# Patient Record
Sex: FEMALE | Race: BLACK OR AFRICAN AMERICAN | NOT HISPANIC OR LATINO | Employment: OTHER | URBAN - METROPOLITAN AREA
[De-identification: names, ages, dates, MRNs, and addresses within clinical notes are randomized per-mention and may not be internally consistent; named-entity substitution may affect disease eponyms.]

---

## 2017-10-09 ENCOUNTER — HOSPITAL ENCOUNTER (OUTPATIENT)
Dept: RADIOLOGY | Facility: HOSPITAL | Age: 62
Discharge: HOME | End: 2017-10-09
Attending: NURSE PRACTITIONER
Payer: MEDICARE

## 2017-10-09 ENCOUNTER — OFFICE VISIT (OUTPATIENT)
Dept: PULMONOLOGY | Facility: CLINIC | Age: 62
End: 2017-10-09
Payer: MEDICARE

## 2017-10-09 ENCOUNTER — PROCEDURE VISIT (OUTPATIENT)
Dept: PULMONOLOGY | Facility: CLINIC | Age: 62
End: 2017-10-09
Payer: MEDICARE

## 2017-10-09 VITALS
HEART RATE: 69 BPM | DIASTOLIC BLOOD PRESSURE: 76 MMHG | OXYGEN SATURATION: 98 % | SYSTOLIC BLOOD PRESSURE: 122 MMHG | RESPIRATION RATE: 18 BRPM | HEIGHT: 67 IN

## 2017-10-09 DIAGNOSIS — G47.33 OSA (OBSTRUCTIVE SLEEP APNEA): ICD-10-CM

## 2017-10-09 DIAGNOSIS — D86.9 SARCOIDOSIS: Primary | ICD-10-CM

## 2017-10-09 DIAGNOSIS — D86.9 SARCOIDOSIS: ICD-10-CM

## 2017-10-09 PROCEDURE — 71020 XR CHEST PA AND LATERAL: CPT | Mod: TC,PO

## 2017-10-09 PROCEDURE — 94729 DIFFUSING CAPACITY: CPT | Mod: PBBFAC,PO | Performed by: GENERAL PRACTICE

## 2017-10-09 PROCEDURE — 94726 PLETHYSMOGRAPHY LUNG VOLUMES: CPT | Mod: PBBFAC,PO | Performed by: GENERAL PRACTICE

## 2017-10-09 PROCEDURE — 99999 PR PBB SHADOW E&M-EST. PATIENT-LVL III: CPT | Mod: PBBFAC,,, | Performed by: NURSE PRACTITIONER

## 2017-10-09 PROCEDURE — 94729 DIFFUSING CAPACITY: CPT | Mod: 26,S$PBB,, | Performed by: INTERNAL MEDICINE

## 2017-10-09 PROCEDURE — 94060 EVALUATION OF WHEEZING: CPT | Mod: PBBFAC,PO | Performed by: GENERAL PRACTICE

## 2017-10-09 PROCEDURE — 94060 EVALUATION OF WHEEZING: CPT | Mod: 26,S$PBB,, | Performed by: INTERNAL MEDICINE

## 2017-10-09 PROCEDURE — 94726 PLETHYSMOGRAPHY LUNG VOLUMES: CPT | Mod: 26,S$PBB,, | Performed by: INTERNAL MEDICINE

## 2017-10-09 PROCEDURE — 71020 XR CHEST PA AND LATERAL: CPT | Mod: 26,,, | Performed by: RADIOLOGY

## 2017-10-09 PROCEDURE — 99214 OFFICE O/P EST MOD 30 MIN: CPT | Mod: S$PBB,,, | Performed by: NURSE PRACTITIONER

## 2017-10-09 PROCEDURE — 99213 OFFICE O/P EST LOW 20 MIN: CPT | Mod: PBBFAC,25,PO | Performed by: NURSE PRACTITIONER

## 2017-10-09 NOTE — PROGRESS NOTES
"Subjective:      Patient ID: Rose Leal is a 62 y.o. female.    Chief Complaint: Sarcoidosis    Sarcoidosis diagnosed by bronchoscopy 1992 by Dr. Villegas. No fever, chills, or hemoptysis. No pleuritic type chest pain. Breathing is stable as compared to 6 months ago. PATEL at times. BMI 36  Severe LYLE dx 2009, not on CPAP. She is having frequent awakenings and not refreshed when she wakes up. We discussed reevaluating her LYLE and anticipate CPAP therapy.   Recent compliants of Chest pain with negative eval per Dr. Greenberg.    No fever, chills, or hemoptysis. No pleuritic type chest pain. Breathing is stable as compared to 6 months ago.      Patient Active Problem List:     Multiple joint pain     H/O sarcoidosis     Sleep apnea     CRP elevated     Parotid gland enlargement     Sicca syndrome     Sarcoidosis     Vitamin D deficiency     Fibromyalgia            /76   Pulse 69   Resp 18   Ht 5' 7" (1.702 m)   SpO2 98%   There is no height or weight on file to calculate BMI.    Review of Systems   Constitutional: Negative.    HENT: Negative.    Respiratory: Negative.    Cardiovascular: Positive for chest pain.   Musculoskeletal: Negative.    Gastrointestinal: Negative.    Neurological: Negative.    Psychiatric/Behavioral: Positive for sleep disturbance.     Objective:      Physical Exam   Constitutional: She is oriented to person, place, and time. She appears well-developed and well-nourished.   Obese   HENT:   Head: Normocephalic and atraumatic.   Mouth/Throat: Oropharynx is clear and moist.   Mallampati Score: III     Neck: Normal range of motion. Neck supple.   Cardiovascular: Normal rate and regular rhythm.  Exam reveals no gallop.    No murmur heard.  Pulmonary/Chest: Effort normal and breath sounds normal.   Abdominal: Soft. She exhibits no mass. There is no tenderness.   Musculoskeletal: Normal range of motion. She exhibits no edema.   Neurological: She is alert and oriented to person, place, and " time.   Skin: Skin is warm and dry.   Psychiatric: She has a normal mood and affect.     Personal Diagnostic Review  PFT: FVC 85 % of predicted. FEV1 82 % of predicted. TLC 72 % of predicted. DLCO 59% of predicted. Minimal decrease so we will review again in 6 months    Results for orders placed during the hospital encounter of 10/09/17   X-Ray Chest PA And Lateral    Narrative Chest two views.  Brigida in 10/17/2016.    Findings: There is stable cardiomegaly and aortic uncoiling.  Lungs remain clear and free of active infiltrate or effusion.  Multilevel thoracic spondylosis.  Postsurgical changes cervical spine.    Impression  Stable chest.      Electronically signed by: KRISTA MEDEROS MD  Date:     10/09/17  Time:    09:56          Assessment:       1. Sarcoidosis    2. LYLE (obstructive sleep apnea)        Outpatient Encounter Prescriptions as of 10/9/2017   Medication Sig Dispense Refill    albuterol 90 mcg/actuation inhaler Inhale 2 puffs into the lungs every 4 (four) hours as needed for Wheezing. 1 Inhaler 2    amitriptyline (ELAVIL) 25 MG tablet Take 1 tablet (25 mg total) by mouth every evening. 90 tablet 3    amlodipine-benazepril (LOTREL) 10-40 mg per capsule Take 1 capsule by mouth once daily.  0    carvedilol (COREG) 6.25 MG tablet Take 6.25 mg by mouth 2 (two) times daily with meals.  0    cyclobenzaprine (FLEXERIL) 10 MG tablet   0    ergocalciferol (ERGOCALCIFEROL) 50,000 unit Cap Take 1 capsule (50,000 Units total) by mouth every 7 days. 12 capsule 1    gabapentin (NEURONTIN) 300 MG capsule Take 1 capsule (300 mg total) by mouth 3 (three) times daily. 90 capsule 3    glimepiride (AMARYL) 1 MG tablet 2 mg once daily.  0    hydrocodone-acetaminophen 7.5-325mg (NORCO) 7.5-325 mg per tablet Take 1 tablet by mouth every 6 (six) hours as needed.      ibuprofen (ADVIL,MOTRIN) 800 MG tablet Take 800 mg by mouth 3 (three) times daily.      methotrexate 2.5 MG Tab Take 4 tablets (10 mg total) by mouth  every 7 days. 20 tablet 1    NEXIUM 40 mg capsule 40 mg once daily.  0    pravastatin (PRAVACHOL) 40 MG tablet Take 40 mg by mouth once daily.  0     No facility-administered encounter medications on file as of 10/9/2017.      No orders of the defined types were placed in this encounter.    Plan:   Formal polysomnogram for evaluation of sleep apnea. Return to clinic when study is available for review.

## 2017-10-10 LAB
POST FEF 25 75: 1.94 L/S (ref 1.46–2.96)
POST FET 100: 8.02 S
POST FEV1 FVC: 79 %
POST FEV1: 1.94 L (ref 1.98–2.64)
POST FIF 50: 2.21 L/S
POST FVC: 2.45 L (ref 2.55–3.33)
POST PEF: 4.3 L/S (ref 4.86–7.05)
PRE DLCO: 15.97 ML/MMHG/MIN (ref 22.81–31.1)
PRE ERV: 0.56 L
PRE FEF 25 75: 1.68 L/S (ref 1.46–2.96)
PRE FET 100: 7.6 S
PRE FEV1 FVC: 76 %
PRE FEV1: 1.89 L (ref 1.98–2.64)
PRE FIF 50: 1.98 L/S
PRE FRC PL: 2.03 L (ref 2–2.95)
PRE FVC: 2.49 L (ref 2.55–3.33)
PRE KROGHS K: 3.76 1/MIN
PRE PEF: 3.88 L/S (ref 4.86–7.05)
PRE RV: 1.43 L (ref 1.75–2.46)
PRE SVC: 2.53 L
PRE TLC: 3.96 L (ref 5.1–5.86)
PREDICTED DLCO: 26.96 ML/MMHG/MIN (ref 22.81–31.1)
PREDICTED FEV1 FVC: 77.63 % (ref 72.74–82.53)
PREDICTED FEV1: 2.31 L (ref 1.98–2.64)
PREDICTED FRC N2: 2.47 L (ref 2–2.95)
PREDICTED FRC PL: 2.47 L (ref 2–2.95)
PREDICTED FVC: 2.94 L (ref 2.55–3.33)
PREDICTED RV: 2.11 L (ref 1.75–2.46)
PREDICTED SVC: 3.31 L
PREDICTED TLC: 5.48 L (ref 5.1–5.86)
PROVOCATION PROTOCOL: ABNORMAL

## 2017-10-26 ENCOUNTER — HOSPITAL ENCOUNTER (OUTPATIENT)
Dept: SLEEP MEDICINE | Facility: HOSPITAL | Age: 62
Discharge: HOME | End: 2017-10-26
Attending: PATHOLOGY
Payer: MEDICARE

## 2017-10-26 DIAGNOSIS — G47.33 OSA (OBSTRUCTIVE SLEEP APNEA): Primary | ICD-10-CM

## 2017-10-26 DIAGNOSIS — Z72.821 INADEQUATE SLEEP HYGIENE: ICD-10-CM

## 2017-10-26 PROCEDURE — 95811 POLYSOM 6/>YRS CPAP 4/> PARM: CPT

## 2017-10-26 PROCEDURE — 95811 POLYSOM 6/>YRS CPAP 4/> PARM: CPT | Mod: 26,,, | Performed by: PSYCHOLOGIST

## 2017-10-26 PROCEDURE — 95810 POLYSOM 6/> YRS 4/> PARAM: CPT

## 2017-11-14 ENCOUNTER — OFFICE VISIT (OUTPATIENT)
Dept: RHEUMATOLOGY | Facility: CLINIC | Age: 62
End: 2017-11-14
Payer: MEDICARE

## 2017-11-14 ENCOUNTER — LAB VISIT (OUTPATIENT)
Dept: LAB | Facility: HOSPITAL | Age: 62
End: 2017-11-14
Attending: INTERNAL MEDICINE
Payer: MEDICARE

## 2017-11-14 VITALS
DIASTOLIC BLOOD PRESSURE: 78 MMHG | HEART RATE: 79 BPM | BODY MASS INDEX: 36.91 KG/M2 | SYSTOLIC BLOOD PRESSURE: 139 MMHG | WEIGHT: 235.69 LBS

## 2017-11-14 DIAGNOSIS — M79.7 FIBROMYALGIA: Primary | ICD-10-CM

## 2017-11-14 DIAGNOSIS — D86.9 SARCOIDOSIS: ICD-10-CM

## 2017-11-14 DIAGNOSIS — G47.33 OBSTRUCTIVE SLEEP APNEA SYNDROME: ICD-10-CM

## 2017-11-14 DIAGNOSIS — E55.9 VITAMIN D DEFICIENCY: ICD-10-CM

## 2017-11-14 LAB
25(OH)D3+25(OH)D2 SERPL-MCNC: 11 NG/ML
ALBUMIN SERPL BCP-MCNC: 3.6 G/DL
ALP SERPL-CCNC: 114 U/L
ALT SERPL W/O P-5'-P-CCNC: 11 U/L
ANION GAP SERPL CALC-SCNC: 10 MMOL/L
AST SERPL-CCNC: 11 U/L
BASOPHILS # BLD AUTO: 0.02 K/UL
BASOPHILS NFR BLD: 0.2 %
BILIRUB SERPL-MCNC: 0.3 MG/DL
BUN SERPL-MCNC: 18 MG/DL
CALCIUM SERPL-MCNC: 9.3 MG/DL
CHLORIDE SERPL-SCNC: 104 MMOL/L
CO2 SERPL-SCNC: 28 MMOL/L
CREAT SERPL-MCNC: 1 MG/DL
CRP SERPL-MCNC: 20.6 MG/L
DIFFERENTIAL METHOD: ABNORMAL
EOSINOPHIL # BLD AUTO: 0.1 K/UL
EOSINOPHIL NFR BLD: 1.6 %
ERYTHROCYTE [DISTWIDTH] IN BLOOD BY AUTOMATED COUNT: 13.8 %
ERYTHROCYTE [SEDIMENTATION RATE] IN BLOOD BY WESTERGREN METHOD: 54 MM/HR
EST. GFR  (AFRICAN AMERICAN): >60 ML/MIN/1.73 M^2
EST. GFR  (NON AFRICAN AMERICAN): >60 ML/MIN/1.73 M^2
GLUCOSE SERPL-MCNC: 163 MG/DL
HCT VFR BLD AUTO: 35.9 %
HGB BLD-MCNC: 11.7 G/DL
LYMPHOCYTES # BLD AUTO: 3 K/UL
LYMPHOCYTES NFR BLD: 34.2 %
MCH RBC QN AUTO: 27.9 PG
MCHC RBC AUTO-ENTMCNC: 32.6 G/DL
MCV RBC AUTO: 86 FL
MONOCYTES # BLD AUTO: 0.4 K/UL
MONOCYTES NFR BLD: 4.9 %
NEUTROPHILS # BLD AUTO: 5.1 K/UL
NEUTROPHILS NFR BLD: 59.1 %
PLATELET # BLD AUTO: 291 K/UL
PMV BLD AUTO: 9.7 FL
POTASSIUM SERPL-SCNC: 3.5 MMOL/L
PROT SERPL-MCNC: 7.8 G/DL
RBC # BLD AUTO: 4.19 M/UL
SODIUM SERPL-SCNC: 142 MMOL/L
WBC # BLD AUTO: 8.69 K/UL

## 2017-11-14 PROCEDURE — 86140 C-REACTIVE PROTEIN: CPT

## 2017-11-14 PROCEDURE — 85025 COMPLETE CBC W/AUTO DIFF WBC: CPT | Mod: PO

## 2017-11-14 PROCEDURE — 99213 OFFICE O/P EST LOW 20 MIN: CPT | Mod: PBBFAC,PO | Performed by: INTERNAL MEDICINE

## 2017-11-14 PROCEDURE — 80053 COMPREHEN METABOLIC PANEL: CPT | Mod: PO

## 2017-11-14 PROCEDURE — 82652 VIT D 1 25-DIHYDROXY: CPT

## 2017-11-14 PROCEDURE — 85651 RBC SED RATE NONAUTOMATED: CPT | Mod: PO

## 2017-11-14 PROCEDURE — 82306 VITAMIN D 25 HYDROXY: CPT

## 2017-11-14 PROCEDURE — 99215 OFFICE O/P EST HI 40 MIN: CPT | Mod: S$PBB,,, | Performed by: INTERNAL MEDICINE

## 2017-11-14 PROCEDURE — 36415 COLL VENOUS BLD VENIPUNCTURE: CPT | Mod: PO

## 2017-11-14 PROCEDURE — 99999 PR PBB SHADOW E&M-EST. PATIENT-LVL III: CPT | Mod: PBBFAC,,, | Performed by: INTERNAL MEDICINE

## 2017-11-14 RX ORDER — ERGOCALCIFEROL 1.25 MG/1
50000 CAPSULE ORAL
Qty: 12 CAPSULE | Refills: 4 | Status: SHIPPED | OUTPATIENT
Start: 2017-11-14 | End: 2018-09-26

## 2017-11-14 NOTE — PROGRESS NOTES
RHEUMATOLOGY CLINIC FOLLOW UP VISIT  Chief complaints:-  My whole body hurts.     HPI:-  Rose Soliz a 62 y.o. pleasant female comes in for a follow up visit with above chief complaints.   Location- All over including muscle, joints and soft tissues.   Severity-Moderate to severe.   Timing- All day.  Modifying factors-Worse with activity of any kind.   Quality- Sharp.  Duration- Several years.   Context- History of sarcoidosis , Fibromyalgia- lost for follow up since 03/2016.   Associated signs & symptoms-  Fatigue, insomnia, joint pain. No associated fever/chills/cough/hemoptysis/unintentional weight loss.     Review of Systems   Constitutional: Negative for chills and fever.   HENT: Negative for congestion and sore throat.    Eyes: Negative for blurred vision and redness.   Respiratory: Negative for cough and shortness of breath.    Cardiovascular: Negative for chest pain and leg swelling.   Gastrointestinal: Negative for abdominal pain.   Genitourinary: Negative for dysuria.   Musculoskeletal: Positive for back pain, joint pain, myalgias and neck pain. Negative for falls.   Skin: Negative for rash.   Neurological: Negative for headaches.   Endo/Heme/Allergies: Does not bruise/bleed easily.   Psychiatric/Behavioral: Negative for memory loss. The patient does not have insomnia.        Past Medical History:   Diagnosis Date    Arthritis     Blunt trauma, left eye     Carpal tunnel syndrome     Diabetes mellitus     Hypertension     Neuromuscular disorder     LYLE (obstructive sleep apnea) Severe    Sarcoidosis of lung        Past Surgical History:   Procedure Laterality Date    BREAST SURGERY      HYSTERECTOMY      NECK SURGERY          Social History   Substance Use Topics    Smoking status: Never Smoker    Smokeless tobacco: Never Used    Alcohol use Yes      Comment: occasional       Family History   Problem Relation Age of Onset     Cataracts Mother     Cataracts Maternal Grandmother     Glaucoma Maternal Grandmother        Review of patient's allergies indicates:   Allergen Reactions    Percocet [oxycodone-acetaminophen] Nausea And Vomiting    Prednisone Edema           Physical examination:-    Vitals:    11/14/17 0756   BP: 139/78   Pulse: 79   Weight: 106.9 kg (235 lb 10.8 oz)   PainSc:   7   PainLoc: Generalized       Physical Exam   Constitutional: She is oriented to person, place, and time and well-developed, well-nourished, and in no distress. No distress.   HENT:   Head: Normocephalic.   Mouth/Throat: Oropharynx is clear and moist.   Eyes: Conjunctivae and EOM are normal. Pupils are equal, round, and reactive to light.   Neck: Normal range of motion. Neck supple.   Cardiovascular: Normal rate and intact distal pulses.    Pulmonary/Chest: Effort normal. No respiratory distress.   Abdominal: Soft. There is no tenderness.   Musculoskeletal:   No synovitis over small joints of hands or feet.  No effusion over large joints.   Neurological: She is alert and oriented to person, place, and time. No cranial nerve deficit.   Skin: Skin is warm. No rash noted. No erythema.   Psychiatric: Mood and affect normal.   Nursing note and vitals reviewed.      Labs:-  Normal ACE levels.     Radiographs:-  Independent visualization of images done. No hilar lymphadenopathy on chest xray.     Medication List with Changes/Refills   Current Medications    ALBUTEROL 90 MCG/ACTUATION INHALER    Inhale 2 puffs into the lungs every 4 (four) hours as needed for Wheezing.    AMITRIPTYLINE (ELAVIL) 25 MG TABLET    Take 1 tablet (25 mg total) by mouth every evening.    AMLODIPINE-BENAZEPRIL (LOTREL) 10-40 MG PER CAPSULE    Take 1 capsule by mouth once daily.    CARVEDILOL (COREG) 6.25 MG TABLET    Take 6.25 mg by mouth 2 (two) times daily with meals.    CYCLOBENZAPRINE (FLEXERIL) 10 MG TABLET        GABAPENTIN (NEURONTIN) 300 MG CAPSULE    Take 1 capsule (300 mg  total) by mouth 3 (three) times daily.    GLIMEPIRIDE (AMARYL) 1 MG TABLET    2 mg once daily.    HYDROCODONE-ACETAMINOPHEN 7.5-325MG (NORCO) 7.5-325 MG PER TABLET    Take 1 tablet by mouth every 6 (six) hours as needed.    IBUPROFEN (ADVIL,MOTRIN) 800 MG TABLET    Take 800 mg by mouth 3 (three) times daily.    NEXIUM 40 MG CAPSULE    40 mg once daily.    PRAVASTATIN (PRAVACHOL) 40 MG TABLET    Take 40 mg by mouth once daily.   Changed and/or Refilled Medications    Modified Medication Previous Medication    ERGOCALCIFEROL (ERGOCALCIFEROL) 50,000 UNIT CAP ergocalciferol (ERGOCALCIFEROL) 50,000 unit Cap       Take 1 capsule (50,000 Units total) by mouth every 7 days.    Take 1 capsule (50,000 Units total) by mouth every 7 days.   Discontinued Medications    METHOTREXATE 2.5 MG TAB    Take 4 tablets (10 mg total) by mouth every 7 days.       Assessment/Plans:-  # Fibromyalgia  Active. On gabapentin, elavil and opioid therapy from pain specialist at [facility]. Would recommend adding SNRI if active symptoms after consistent use of CPAP therapy for Obstructive sleep apnea syndrome.    # Vitamin D deficiency  Check levels today. Restart vitamin D supplementation.   - Vitamin D; Future  - ergocalciferol (ERGOCALCIFEROL) 50,000 unit Cap; Take 1 capsule (50,000 Units total) by mouth every 7 days.  Dispense: 12 capsule; Refill: 4    # Sarcoidosis  Sarcoidosis diagnosed by bronchoscopy 1992 by Dr. Villegas. No active symptoms. No systemic B symptoms. Last ACE level normal. Not on any DMARD's. Lost for follow up since 03/2016. Recent chest xray normal. Severe sleep apnea syndrome .   - CBC auto differential; Standing  - Comprehensive metabolic panel; Standing  - C-reactive protein; Standing  - Sedimentation rate, manual; Standing  - Angiotensin converting enzyme; Standing  - Calcitriol; Future    # Obstructive sleep apnea syndrome  Recent sleep study showed severe apnea with improvement on CPAP. Recommended to contact  sleep clinic for initiation of CPAP therapy.     # Return in about 1 year (around 11/14/2018).    Time spent: 30 minutes in face to face discussion concerning diagnosis, prognosis, review of lab and test results, benefits of treatment as well as management of disease, counseling of patient and coordination of care between various health care providers . Greater than half the time spent was used for coordination of care and counseling of patient.      Disclaimer: This note was prepared using voice recognition system and is likely to have sound alike errors and is not proof read.  Please call me with any questions.

## 2017-11-14 NOTE — ASSESSMENT & PLAN NOTE
Recent sleep study showed severe apnea with improvement on CPAP. Recommended to contact sleep clinic for initiation of CPAP therapy.

## 2017-11-14 NOTE — ASSESSMENT & PLAN NOTE
Sarcoidosis diagnosed by bronchoscopy 1992 by Dr. Villegas. No active symptoms. No systemic B symptoms. Last ACE level normal. Not on any DMARD's. Lost for follow up since 03/2016. Recent chest xray normal. Severe sleep apnea syndrome .

## 2017-11-14 NOTE — ASSESSMENT & PLAN NOTE
Active. On gabapentin, elavil and opioid therapy from pain specialist at [facility] center. Would recommend adding SNRI if active symptoms after consistent use of CPAP therapy for Obstructive sleep apnea syndrome.

## 2017-11-15 LAB — ACE SERPL-CCNC: 8 U/L

## 2017-11-16 LAB — 1,25(OH)2D3 SERPL-MCNC: 58 PG/ML

## 2018-02-28 ENCOUNTER — OFFICE VISIT (OUTPATIENT)
Dept: SLEEP MEDICINE | Facility: CLINIC | Age: 63
End: 2018-02-28
Payer: MEDICARE

## 2018-02-28 VITALS
OXYGEN SATURATION: 95 % | HEART RATE: 112 BPM | DIASTOLIC BLOOD PRESSURE: 70 MMHG | HEIGHT: 67 IN | SYSTOLIC BLOOD PRESSURE: 120 MMHG | BODY MASS INDEX: 37.02 KG/M2 | RESPIRATION RATE: 17 BRPM | WEIGHT: 235.88 LBS

## 2018-02-28 DIAGNOSIS — G47.33 OSA (OBSTRUCTIVE SLEEP APNEA): ICD-10-CM

## 2018-02-28 DIAGNOSIS — D86.9 SARCOIDOSIS: Primary | ICD-10-CM

## 2018-02-28 DIAGNOSIS — R05.9 COUGH: ICD-10-CM

## 2018-02-28 PROCEDURE — 99214 OFFICE O/P EST MOD 30 MIN: CPT | Mod: S$PBB,,, | Performed by: NURSE PRACTITIONER

## 2018-02-28 PROCEDURE — 99999 PR PBB SHADOW E&M-EST. PATIENT-LVL IV: CPT | Mod: PBBFAC,,, | Performed by: NURSE PRACTITIONER

## 2018-02-28 PROCEDURE — 99214 OFFICE O/P EST MOD 30 MIN: CPT | Mod: PBBFAC,PO | Performed by: NURSE PRACTITIONER

## 2018-02-28 RX ORDER — METHYLPREDNISOLONE 4 MG/1
TABLET ORAL
Qty: 1 PACKAGE | Refills: 0 | Status: SHIPPED | OUTPATIENT
Start: 2018-02-28 | End: 2018-07-10 | Stop reason: ALTCHOICE

## 2018-02-28 NOTE — PROGRESS NOTES
"Subjective:      Patient ID: Rose Leal is a 63 y.o. female.    Chief Complaint: Cough    Patient presents to the office today for cough for 3 weeks.  She has sleep study in October, and missed her appointment in November.  She has daytime hypersomnolence, frequent awakenings.  Poor sleep quality.  Diagnosis with severe sleep apnea in 2009. No on CPAP.    On 2/9/18 patient went to [facility] ED with cough, chest pain.  She was diagnosed with an upper respiratory infection.  She went to see Dr. Manny Escalante.  She was prescribed promethazine DM, doxycycline, Symbicort, Tessalon, Xyzal.  She has not received her Xyzal.  Instructed to take Symbicort 2 puffs, twice a day at this time.  Her cough is typically dry, but clear mucus at times.  Possible wheezing        Sarcoidosis diagnosed by bronchoscopy 1992 by Dr. Villegas. No fever, chills, or hemoptysis. No pleuritic type chest pain.        Patient Active Problem List:     Multiple joint pain     H/O sarcoidosis     Sleep apnea     CRP elevated     Parotid gland enlargement     Sicca syndrome     Sarcoidosis     Vitamin D deficiency     Fibromyalgia            /70   Pulse (!) 112   Resp 17   Ht 5' 7" (1.702 m)   Wt 107 kg (235 lb 14.3 oz)   SpO2 95%   BMI 36.95 kg/m²   Body mass index is 36.95 kg/m².    Review of Systems   Constitutional: Negative.    HENT: Positive for postnasal drip.    Respiratory: Positive for cough and shortness of breath.    Cardiovascular: Negative.    Musculoskeletal: Positive for arthralgias.   Gastrointestinal: Negative.    Neurological: Negative.    Psychiatric/Behavioral: Negative.      Objective:      Physical Exam   Constitutional: She is oriented to person, place, and time. She appears well-developed and well-nourished.   Obese   HENT:   Head: Normocephalic and atraumatic.   Mouth/Throat: Oropharynx is clear and moist.   Neck: Normal range of motion. Neck supple.   Cardiovascular: Normal rate and regular rhythm.  Exam reveals " no gallop.    No murmur heard.  Pulmonary/Chest: Effort normal and breath sounds normal.   Abdominal: Soft. She exhibits no mass. There is no tenderness.   Musculoskeletal: Normal range of motion. She exhibits no edema.   Neurological: She is alert and oriented to person, place, and time.   Skin: Skin is warm and dry.   Psychiatric: She has a normal mood and affect.     Personal Diagnostic Review  Review of CXR from [facility] ED without infiltrate.        The diagnostic polysomnography revealed a severe obstructive sleep apnea / hypopnea syndrome (A + H Index = 71.6 events  /hr asleep with 64.1 respiratory event - arousals /hr asleep for the study, and an additional 12.3 RERAs /hr asleep (respiratory  effort - related arousals). The mean SpO2 value was 94.8 %, mild, minimum oxygen saturation during sleep was 84.0 %, and  waking baseline SpO2 was 98 %. Persistent, moderately loud snoring was noted.  2. CPAP was initiated at 1:44 am. The titration polysomnography revealed that BiPAP (Bi - Flex 3, humidity 2) of 20 cm IPAP  / 16 cm EPAP, the most effective pressure most completely tested, was optimal, as it reduced the rate of respiratory events  to zero in 0.6 hrs sleep (0.3 hrs REM sleep). However, CPAP of 14 cm also was fully tested and also was very effective  (A + H I = 0.7, with 0.4 hrs REM sleep in 1.4 hrs sleep). Soft, sporadic snoring (less severe than pre - CPAP) persisted at  all pressures tested. AutoCPAP (4 cm - 20 cm) could be tried if necessary.        Assessment:       1. Sarcoidosis    2. Cough    3. LYLE (obstructive sleep apnea)        Outpatient Encounter Prescriptions as of 2/28/2018   Medication Sig Dispense Refill    albuterol 90 mcg/actuation inhaler Inhale 2 puffs into the lungs every 4 (four) hours as needed for Wheezing. 1 Inhaler 2    amitriptyline (ELAVIL) 25 MG tablet Take 1 tablet (25 mg total) by mouth every evening. 90 tablet 3    amlodipine-benazepril (LOTREL) 10-40 mg per capsule Take 1  capsule by mouth once daily.  0    carvedilol (COREG) 6.25 MG tablet Take 6.25 mg by mouth 2 (two) times daily with meals.  0    cyclobenzaprine (FLEXERIL) 10 MG tablet   0    ergocalciferol (ERGOCALCIFEROL) 50,000 unit Cap Take 1 capsule (50,000 Units total) by mouth every 7 days. 12 capsule 4    gabapentin (NEURONTIN) 300 MG capsule Take 1 capsule (300 mg total) by mouth 3 (three) times daily. 90 capsule 3    glimepiride (AMARYL) 1 MG tablet 2 mg once daily.  0    hydrocodone-acetaminophen 7.5-325mg (NORCO) 7.5-325 mg per tablet Take 1 tablet by mouth every 6 (six) hours as needed.      ibuprofen (ADVIL,MOTRIN) 800 MG tablet Take 800 mg by mouth 3 (three) times daily.      NEXIUM 40 mg capsule 40 mg once daily.  0    pravastatin (PRAVACHOL) 40 MG tablet Take 40 mg by mouth once daily.  0    methylPREDNISolone (MEDROL DOSEPACK) 4 mg tablet use as directed 1 Package 0     No facility-administered encounter medications on file as of 2/28/2018.      Orders Placed This Encounter   Procedures    CPAP FOR HOME USE     Order Specific Question:   Type:     Answer:   CPAP     Order Specific Question:   CPAP setting (cmH20):     Answer:   14     Order Specific Question:   Length of need (1-99 months):     Answer:   99     Order Specific Question:   Humidification:     Answer:   Heated     Order Specific Question:   Type of mask:     Answer:   FFM     Order Specific Question:   Headgear?     Answer:   Yes     Order Specific Question:   Tubing?     Answer:   Yes     Order Specific Question:   Humidifier chamber?     Answer:   Yes     Order Specific Question:   Chin strap?     Answer:   Yes     Order Specific Question:   Filters?     Answer:   Yes     Plan:   Continue current regimen and add medrol dose yue.   CPAP 14 cm H2O  follow up in 8 weeks with download of data card and review of symptoms.

## 2018-04-25 ENCOUNTER — OFFICE VISIT (OUTPATIENT)
Dept: SLEEP MEDICINE | Facility: CLINIC | Age: 63
End: 2018-04-25
Payer: MEDICARE

## 2018-04-25 VITALS
SYSTOLIC BLOOD PRESSURE: 124 MMHG | BODY MASS INDEX: 37.68 KG/M2 | DIASTOLIC BLOOD PRESSURE: 70 MMHG | OXYGEN SATURATION: 98 % | HEIGHT: 67 IN | RESPIRATION RATE: 18 BRPM | WEIGHT: 240.06 LBS | HEART RATE: 73 BPM

## 2018-04-25 DIAGNOSIS — G47.33 OSA (OBSTRUCTIVE SLEEP APNEA): ICD-10-CM

## 2018-04-25 DIAGNOSIS — D86.9 SARCOIDOSIS: Primary | ICD-10-CM

## 2018-04-25 PROCEDURE — 99214 OFFICE O/P EST MOD 30 MIN: CPT | Mod: S$PBB,,, | Performed by: NURSE PRACTITIONER

## 2018-04-25 PROCEDURE — 99999 PR PBB SHADOW E&M-EST. PATIENT-LVL IV: CPT | Mod: PBBFAC,,, | Performed by: NURSE PRACTITIONER

## 2018-04-25 PROCEDURE — 99214 OFFICE O/P EST MOD 30 MIN: CPT | Mod: PBBFAC,PO | Performed by: NURSE PRACTITIONER

## 2018-04-25 NOTE — PROGRESS NOTES
"Subjective:      Patient ID: Rose Leal is a 63 y.o. female.    Chief Complaint: Sarcoidosis and Sleep Apnea    Presents with LYLE and Sarcoidosis to office for review of CPAP therapy. Pressure setting 14cm H2O with [employer].  Patient states improved symptoms with use of CPAP. Sleeping more soundly. Waking up feeling more refreshed. Improved daytime sleepiness. Patient states she is benefiting from use of the CPAP.   Patient Active Problem List:     Multiple joint pain     H/O sarcoidosis     Obstructive sleep apnea syndrome     CRP elevated     Parotid gland enlargement     Sicca syndrome     Sarcoidosis     Vitamin D deficiency     Fibromyalgia     Inadequate sleep hygiene            /70   Pulse 73   Resp 18   Ht 5' 7" (1.702 m)   Wt 108.9 kg (240 lb 1.3 oz)   SpO2 98%   BMI 37.60 kg/m²   Body mass index is 37.6 kg/m².    Review of Systems   Constitutional: Negative.    HENT: Negative.    Respiratory: Negative for shortness of breath.    Cardiovascular: Negative.    Musculoskeletal: Negative.    Gastrointestinal: Negative.    Neurological: Negative.    Psychiatric/Behavioral: Negative.      Objective:      Physical Exam   Constitutional: She is oriented to person, place, and time. She appears well-developed and well-nourished.   Obese   HENT:   Head: Normocephalic and atraumatic.   Mouth/Throat: Oropharynx is clear and moist.   Neck: Normal range of motion. Neck supple.   Cardiovascular: Normal rate and regular rhythm.  Exam reveals no gallop.    No murmur heard.  Pulmonary/Chest: Effort normal and breath sounds normal.   Abdominal: Soft. She exhibits no mass. There is no tenderness.   Musculoskeletal: Normal range of motion. She exhibits no edema.   Neurological: She is alert and oriented to person, place, and time.   Skin: Skin is warm and dry.   Psychiatric: She has a normal mood and affect.     Personal Diagnostic Review    CPAP download over the last 30 days show patient wears on average 6 hrs " and 53 minutes. Greater than 4 hrs 96 % of the time. AHI 4    Assessment:       1. Sarcoidosis    2. LYLE (obstructive sleep apnea)        Outpatient Encounter Prescriptions as of 4/25/2018   Medication Sig Dispense Refill    albuterol 90 mcg/actuation inhaler Inhale 2 puffs into the lungs every 4 (four) hours as needed for Wheezing. 1 Inhaler 2    amitriptyline (ELAVIL) 25 MG tablet Take 1 tablet (25 mg total) by mouth every evening. 90 tablet 3    amlodipine-benazepril (LOTREL) 10-40 mg per capsule Take 1 capsule by mouth once daily.  0    carvedilol (COREG) 6.25 MG tablet Take 6.25 mg by mouth 2 (two) times daily with meals.  0    cyclobenzaprine (FLEXERIL) 10 MG tablet   0    ergocalciferol (ERGOCALCIFEROL) 50,000 unit Cap Take 1 capsule (50,000 Units total) by mouth every 7 days. 12 capsule 4    gabapentin (NEURONTIN) 300 MG capsule Take 1 capsule (300 mg total) by mouth 3 (three) times daily. 90 capsule 3    glimepiride (AMARYL) 1 MG tablet 2 mg once daily.  0    hydrocodone-acetaminophen 7.5-325mg (NORCO) 7.5-325 mg per tablet Take 1 tablet by mouth every 6 (six) hours as needed.      ibuprofen (ADVIL,MOTRIN) 800 MG tablet Take 800 mg by mouth 3 (three) times daily.      NEXIUM 40 mg capsule 40 mg once daily.  0    pravastatin (PRAVACHOL) 40 MG tablet Take 40 mg by mouth once daily.  0    methylPREDNISolone (MEDROL DOSEPACK) 4 mg tablet use as directed 1 Package 0     No facility-administered encounter medications on file as of 4/25/2018.      Orders Placed This Encounter   Procedures    X-Ray Chest PA And Lateral     Standing Status:   Future     Standing Expiration Date:   4/25/2019     Order Specific Question:   May the Radiologist modify the order per protocol to meet the clinical needs of the patient?     Answer:   Yes    Complete PFT with bronchodilator     Standing Status:   Future     Standing Expiration Date:   4/25/2019     Plan:      Doing well on PAP settings. Patient is compliant.  Follow up in 6 months with PAP data download, CXR, PFT or call earlier if any problems.

## 2018-06-07 ENCOUNTER — TELEPHONE (OUTPATIENT)
Dept: HEMATOLOGY/ONCOLOGY | Facility: CLINIC | Age: 63
End: 2018-06-07

## 2018-06-08 ENCOUNTER — TELEPHONE (OUTPATIENT)
Dept: HEMATOLOGY/ONCOLOGY | Facility: CLINIC | Age: 63
End: 2018-06-08

## 2018-06-08 NOTE — TELEPHONE ENCOUNTER
----- Message from Audra Butt sent at 2018 11:03 AM CDT -----  Contact: pt   .Pt was returning nurse call. Pt states that if she don't answer she will return the call cause she is going to a .    [phone] (home)

## 2018-07-03 ENCOUNTER — INITIAL CONSULT (OUTPATIENT)
Dept: HEMATOLOGY/ONCOLOGY | Facility: CLINIC | Age: 63
End: 2018-07-03
Payer: MEDICARE

## 2018-07-03 ENCOUNTER — LAB VISIT (OUTPATIENT)
Dept: LAB | Facility: HOSPITAL | Age: 63
End: 2018-07-03
Attending: INTERNAL MEDICINE
Payer: MEDICARE

## 2018-07-03 VITALS
SYSTOLIC BLOOD PRESSURE: 149 MMHG | HEIGHT: 67 IN | HEART RATE: 83 BPM | DIASTOLIC BLOOD PRESSURE: 82 MMHG | TEMPERATURE: 98 F | RESPIRATION RATE: 20 BRPM | WEIGHT: 241.19 LBS | OXYGEN SATURATION: 98 % | BODY MASS INDEX: 37.85 KG/M2

## 2018-07-03 DIAGNOSIS — D51.8 OTHER VITAMIN B12 DEFICIENCY ANEMIA: ICD-10-CM

## 2018-07-03 DIAGNOSIS — D64.9 NORMOCYTIC ANEMIA: ICD-10-CM

## 2018-07-03 DIAGNOSIS — D64.9 NORMOCYTIC ANEMIA: Primary | ICD-10-CM

## 2018-07-03 LAB
ALBUMIN SERPL BCP-MCNC: 3.5 G/DL
ALP SERPL-CCNC: 109 U/L
ALT SERPL W/O P-5'-P-CCNC: 12 U/L
ANION GAP SERPL CALC-SCNC: 7 MMOL/L
AST SERPL-CCNC: 12 U/L
BASOPHILS # BLD AUTO: 0.01 K/UL
BASOPHILS NFR BLD: 0.1 %
BILIRUB SERPL-MCNC: 0.3 MG/DL
BUN SERPL-MCNC: 15 MG/DL
CALCIUM SERPL-MCNC: 9.2 MG/DL
CHLORIDE SERPL-SCNC: 106 MMOL/L
CO2 SERPL-SCNC: 28 MMOL/L
CREAT SERPL-MCNC: 1 MG/DL
DIFFERENTIAL METHOD: ABNORMAL
EOSINOPHIL # BLD AUTO: 0.1 K/UL
EOSINOPHIL NFR BLD: 1.7 %
ERYTHROCYTE [DISTWIDTH] IN BLOOD BY AUTOMATED COUNT: 14.8 %
EST. GFR  (AFRICAN AMERICAN): >60 ML/MIN/1.73 M^2
EST. GFR  (NON AFRICAN AMERICAN): >60 ML/MIN/1.73 M^2
FERRITIN SERPL-MCNC: 105 NG/ML
FOLATE SERPL-MCNC: 5.7 NG/ML
GLUCOSE SERPL-MCNC: 169 MG/DL
HCT VFR BLD AUTO: 35.1 %
HGB BLD-MCNC: 11.1 G/DL
IRON SERPL-MCNC: 61 UG/DL
LYMPHOCYTES # BLD AUTO: 2.6 K/UL
LYMPHOCYTES NFR BLD: 33.8 %
MCH RBC QN AUTO: 26.9 PG
MCHC RBC AUTO-ENTMCNC: 31.6 G/DL
MCV RBC AUTO: 85 FL
MONOCYTES # BLD AUTO: 0.4 K/UL
MONOCYTES NFR BLD: 5.3 %
NEUTROPHILS # BLD AUTO: 4.6 K/UL
NEUTROPHILS NFR BLD: 59.1 %
PLATELET # BLD AUTO: 273 K/UL
PMV BLD AUTO: 9.4 FL
POTASSIUM SERPL-SCNC: 3.7 MMOL/L
PROT SERPL-MCNC: 7.2 G/DL
RBC # BLD AUTO: 4.12 M/UL
SATURATED IRON: 16 %
SODIUM SERPL-SCNC: 141 MMOL/L
TOTAL IRON BINDING CAPACITY: 383 UG/DL
TRANSFERRIN SERPL-MCNC: 259 MG/DL
VIT B12 SERPL-MCNC: 262 PG/ML
WBC # BLD AUTO: 7.77 K/UL

## 2018-07-03 PROCEDURE — 83540 ASSAY OF IRON: CPT

## 2018-07-03 PROCEDURE — 84165 PROTEIN E-PHORESIS SERUM: CPT | Mod: 26,,, | Performed by: PATHOLOGY

## 2018-07-03 PROCEDURE — 80053 COMPREHEN METABOLIC PANEL: CPT | Mod: PO

## 2018-07-03 PROCEDURE — 99999 PR PBB SHADOW E&M-EST. PATIENT-LVL III: CPT | Mod: PBBFAC,,, | Performed by: INTERNAL MEDICINE

## 2018-07-03 PROCEDURE — 84165 PROTEIN E-PHORESIS SERUM: CPT

## 2018-07-03 PROCEDURE — 82607 VITAMIN B-12: CPT

## 2018-07-03 PROCEDURE — 99213 OFFICE O/P EST LOW 20 MIN: CPT | Mod: PBBFAC,PO | Performed by: INTERNAL MEDICINE

## 2018-07-03 PROCEDURE — 82746 ASSAY OF FOLIC ACID SERUM: CPT

## 2018-07-03 PROCEDURE — 82728 ASSAY OF FERRITIN: CPT

## 2018-07-03 PROCEDURE — 36415 COLL VENOUS BLD VENIPUNCTURE: CPT | Mod: PO

## 2018-07-03 PROCEDURE — 99205 OFFICE O/P NEW HI 60 MIN: CPT | Mod: S$PBB,,, | Performed by: INTERNAL MEDICINE

## 2018-07-03 PROCEDURE — 85025 COMPLETE CBC W/AUTO DIFF WBC: CPT | Mod: PO

## 2018-07-03 NOTE — PROGRESS NOTES
OUTPATIENT    MAIN COMPLAINT:  We are asked by Dr. Manny Escalante to evaluate this   63-year-old  lady in regard to her anemia.    HISTORY OF PRESENT ILLNESS:  This is a 63-year-old  lady who had   a CBC done at the office of her PCP on 05/22/2018.  The hemoglobin was 10.2   with an MCV of 86.  White cell count was 9000 with normal differential and the   platelet count was 289,000.  There were some records that accompany the patient   from the [facility] where it states that her hemoglobin was 6.7 during   admission to that facility in May 2018.  There is no discharge summary.  One has   been requested.  The patient says she does not remember details of that   admission since she has memory lapses.  She says she did not have a blood   transfusion.    The patient says she had a colonoscopy in 2017, outside the clinic and was told   that it was okay.  This colonoscopy was done because of abdominal pain.  Other   problems of the patient include fibromyalgia and sarcoidosis diagnosed by   bronchoscopy since 1992.    ALLERGIES:  Percocet and prednisone.    PREVIOUS SURGERIES:  Cyst removed from the left breast and hysterectomy.    SOCIAL HISTORY:  She is , with three children.  She lives in [place].   She never smoked.  Denies significant drinking.  She is retired.  She did many   jobs including a , , certified nursing assistant,   and a .    FAMILY HISTORY:  Maternal grandmother had breast cancer.  Paternal grandmother   had leukemia.  Mother and father had diabetes.  Father had coronary stents.    PAST MEDICAL HISTORY:  1.  Obesity.  2.  High blood pressure.  3.  Fibromyalgia.  4.  Sarcoidosis diagnosed by bronchoscopy in 1992.  5.  Diabetes mellitus.  6.  Normocytic anemia.    REVIEW OF SYSTEMS:  GENERAL:  No weight loss or fatigue.  EYES:  No vision problems or excessive lacrimations.  OROPHARYNX:  No difficulty or pain on  swallowing.  ENDOCRINE:  No heat or cold intolerance.  LUNGS:  Moderate shortness of breath on exertion.  CARDIOVASCULAR:  No chest pains or palpitation.  GASTROINTESTINAL:  No nausea, vomiting, or diarrhea.  No blood in the stools or   melena.  GENITOURINARY:  No hematuria, kidney stones or kidney or bladder problems.  No   gynecological issues.  BREASTS:  Some tender breasts, which she said due to caffeine ingestion.  No   masses.  SKIN:  No blisters or ecchymosis.  NEUROLOGIC:  No headaches or seizures.  PSYCHIATRIC:  No mood swings or depression.    PHYSICAL EXAMINATION:  GENERAL:  She was well groomed.  She interacted normally during the interview.  VITAL SIGNS:  Weight 241 pounds, height 67 inches, blood pressure 149/82, pulse   82, respirations 20, and temperature 98.3.  EYES:  No jaundice or paleness.  OROPHARYNX:  No ulcers.  No exudates.  ENDOCRINE:  No palpable thyroid.  LYMPHATICS:  No cervical or supraclavicular adenopathy.  NECK:  No jugular venous distension or masses.  LUNGS:  Clear and well ventilated without rales, wheezes, or rhonchi.  CARDIOVASCULAR:  Heart was rhythmic without murmurs or gallops.  ABDOMEN:  Soft.  No obvious hepatosplenomegaly, guarding, or rebound.  EXTREMITIES:  No edema.  Good dorsalis pedis and posterior tibial pulses.  SKIN:  No rashes or ecchymosis.  NEUROLOGIC:  Coordination, strength and gait were normal.  PSYCHIATRIC:  Mood was appropriate.  Lucid and oriented x3.    DISCUSSION:  This patient comes today for evaluation of anemia.  The lab results   from the [facility] in early May 2018 showed that her hemoglobin at   that time was 6.7.  The most recent one is 10.2.  The patient was unable to   provide any details of why the hemoglobin was so low.    Discharge summary from that admission at the [facility] have been   requested.  Blood test will be done today for a CBC, CMP, ferritin, TIBC, B12,   folic acid, and serum protein electrophoresis.  See me in  about a week.      LAYLA/TINY  dd: 07/03/2018 09:41:31 (CDT)  td: 07/03/2018 10:44:14 (CDT)  Doc ID   #[id number]  Job ID #[id number]    CC: Manny Escalante M.D.

## 2018-07-05 LAB
ALBUMIN SERPL ELPH-MCNC: 3.6 G/DL
ALPHA1 GLOB SERPL ELPH-MCNC: 0.34 G/DL
ALPHA2 GLOB SERPL ELPH-MCNC: 0.97 G/DL
B-GLOBULIN SERPL ELPH-MCNC: 0.95 G/DL
GAMMA GLOB SERPL ELPH-MCNC: 0.95 G/DL
PATHOLOGIST INTERPRETATION SPE: NORMAL
PROT SERPL-MCNC: 6.8 G/DL

## 2018-07-10 ENCOUNTER — APPOINTMENT (RX ONLY)
Dept: URBAN - METROPOLITAN AREA CLINIC 38 | Facility: CLINIC | Age: 63
Setting detail: DERMATOLOGY
End: 2018-07-10

## 2018-07-10 ENCOUNTER — OFFICE VISIT (OUTPATIENT)
Dept: HEMATOLOGY/ONCOLOGY | Facility: CLINIC | Age: 63
End: 2018-07-10
Payer: MEDICARE

## 2018-07-10 VITALS
HEIGHT: 67 IN | BODY MASS INDEX: 37.58 KG/M2 | OXYGEN SATURATION: 100 % | SYSTOLIC BLOOD PRESSURE: 128 MMHG | DIASTOLIC BLOOD PRESSURE: 64 MMHG | HEART RATE: 71 BPM | TEMPERATURE: 98 F | WEIGHT: 239.44 LBS

## 2018-07-10 DIAGNOSIS — D63.8 ANEMIA, CHRONIC DISEASE: ICD-10-CM

## 2018-07-10 DIAGNOSIS — R68.89 FORGETFULNESS: ICD-10-CM

## 2018-07-10 DIAGNOSIS — L29.89 OTHER PRURITUS: ICD-10-CM

## 2018-07-10 DIAGNOSIS — L29.8 OTHER PRURITUS: ICD-10-CM

## 2018-07-10 DIAGNOSIS — D51.8 OTHER VITAMIN B12 DEFICIENCY ANEMIAS: ICD-10-CM

## 2018-07-10 PROBLEM — L70.0 ACNE VULGARIS: Status: ACTIVE | Noted: 2018-07-10

## 2018-07-10 PROBLEM — K75.9 INFLAMMATORY LIVER DISEASE, UNSPECIFIED: Status: ACTIVE | Noted: 2018-07-10

## 2018-07-10 PROCEDURE — 99202 OFFICE O/P NEW SF 15 MIN: CPT

## 2018-07-10 PROCEDURE — 99213 OFFICE O/P EST LOW 20 MIN: CPT | Mod: PBBFAC,PO | Performed by: INTERNAL MEDICINE

## 2018-07-10 PROCEDURE — 99213 OFFICE O/P EST LOW 20 MIN: CPT | Mod: S$PBB,,, | Performed by: INTERNAL MEDICINE

## 2018-07-10 PROCEDURE — 99999 PR PBB SHADOW E&M-EST. PATIENT-LVL III: CPT | Mod: PBBFAC,,, | Performed by: INTERNAL MEDICINE

## 2018-07-10 RX ORDER — HYDROCODONE BITARTRATE AND ACETAMINOPHEN 10; 325 MG/1; MG/1
1 TABLET ORAL EVERY 12 HOURS PRN
COMMUNITY

## 2018-07-10 NOTE — PROGRESS NOTES
Subjective:       Patient ID: Rose Leal is a 63 y.o. female.    Chief Complaint: Follow-up    HPI  This is a 63-year-old  lady who had   a CBC done at the office of her PCP on 05/22/2018.  The hemoglobin was 10.2   with an MCV of 86.  White cell count was 9000 with normal differential and the   platelet count was 289,000.  There were some records that accompany the patient   from the [facility] where it states that her hemoglobin was 6.7 during   admission to that facility in May 2018.  Therewas no discharge summary.  One   was requested but up to now, we have not been to get information about this admission and the patietns says she has memory lapses and is unable to remember anything about that admission/  .   T The patient says she had a colonoscopy in 2017, outside the clinic and was told   that it was okay.  This colonoscopy was done because of abdominal pain.  Other   problems of the patient include fibromyalgia and sarcoidosis diagnosed by   bronchoscopy since 1992.  I ordered lab tests that showed a HGB of 11.1  Ferritin/TIBc/spep/cmp/b12 and folate levels have debbie Ok     ALLERGIES:  Percocet and prednisone.     PREVIOUS SURGERIES:  Cyst removed from the left breast and hysterectomy.  gallbladdeer surgery, neck surgery on the left side for unclear reasons     SOCIAL HISTORY:  She is , with three children.  She lives in [place].   She never smoked.  Denies significant drinking.  She is retired.  She did many   jobs including a , , certified nursing assistant,   and a .     FAMILY HISTORY:  Maternal grandmother had breast cancer.  Paternal grandmother   had leukemia.  Mother and father had diabetes.  Father had coronary stents.     PAST MEDICAL HISTORY:  1.  Obesity.  2.  High blood pressure.  3.  Fibromyalgia.  4.  Sarcoidosis diagnosed by bronchoscopy in 1992.  5.  Diabetes mellitus.  6.  Normocytic anemia.     Review of Systems    Constitutional: Negative.    HENT: Negative.    Eyes: Negative.    Respiratory: Negative.  Negative for cough and wheezing.    Cardiovascular: Negative.  Negative for chest pain.   Gastrointestinal: Negative.    Genitourinary: Negative.    Neurological: Negative.    Psychiatric/Behavioral: Negative.        Objective:      Physical Exam   Constitutional: She is oriented to person, place, and time. She appears well-developed. No distress.   HENT:   Head: Normocephalic.   Right Ear: Tympanic membrane, external ear and ear canal normal.   Left Ear: Tympanic membrane, external ear and ear canal normal.   Nose: Nose normal. Right sinus exhibits no maxillary sinus tenderness and no frontal sinus tenderness. Left sinus exhibits no maxillary sinus tenderness and no frontal sinus tenderness.   Mouth/Throat: Oropharynx is clear and moist and mucous membranes are normal.   Teeth normal.  Gums normal.   Eyes: Conjunctivae and lids are normal. Pupils are equal, round, and reactive to light.   Neck: Normal carotid pulses, no hepatojugular reflux and no JVD present. Carotid bruit is not present. No tracheal deviation present. No thyroid mass and no thyromegaly present.   Cardiovascular: Normal rate, regular rhythm, S1 normal, S2 normal, normal heart sounds and intact distal pulses.  Exam reveals no gallop and no friction rub.    No murmur heard.  Carotid exam normal   Pulmonary/Chest: Effort normal and breath sounds normal. No accessory muscle usage. No respiratory distress. She has no wheezes. She has no rales. She exhibits no tenderness.   Abdominal: Soft. Normal appearance. She exhibits no distension and no mass. There is no splenomegaly or hepatomegaly. There is no tenderness. There is no rebound and no guarding.   Musculoskeletal: Normal range of motion. She exhibits no edema or tenderness.        Right hand: Normal.        Left hand: Normal.       Lymphadenopathy:     She has no cervical adenopathy.     She has no axillary  adenopathy.        Right: No inguinal and no supraclavicular adenopathy present.        Left: No inguinal and no supraclavicular adenopathy present.   Neurological: She is alert and oriented to person, place, and time. She has normal strength. No cranial nerve deficit. Coordination normal.   Skin: Skin is warm and dry. No rash noted. She is not diaphoretic. No cyanosis or erythema. No pallor. Nails show no clubbing.   Psychiatric: She has a normal mood and affect. Her behavior is normal. Judgment and thought content normal.       Wt Readings from Last 3 Encounters:   07/10/18 108.6 kg (239 lb 6.7 oz)   07/03/18 109.4 kg (241 lb 2.9 oz)   04/25/18 108.9 kg (240 lb 1.3 oz)     Temp Readings from Last 3 Encounters:   07/10/18 97.9 °F (36.6 °C) (Oral)   07/03/18 98.3 °F (36.8 °C) (Oral)     BP Readings from Last 3 Encounters:   07/10/18 128/64   07/03/18 (!) 149/82   04/25/18 124/70     Pulse Readings from Last 3 Encounters:   07/10/18 71   07/03/18 83   04/25/18 73       Assessment:       1. Anemia, chronic disease    2. Other vitamin B12 deficiency anemias      3-forgetfulness  Plan:       Lab Results   Component Value Date    WBC 7.77 07/03/2018    HGB 11.1 (L) 07/03/2018    HCT 35.1 (L) 07/03/2018    MCV 85 07/03/2018     07/03/2018     Work up, short of abone marrow has been negative. I think it is OK to follow her expectantly. Return in 2 months with a cbc/ferritin/tibc and B12 levels

## 2018-07-10 NOTE — HPI: FREE FORM (INITIAL HISTORY)
How Severe Is Your Skin Condition? (The Patient Describes The Severity Level As....): moderate
What Brings You In Today? (This Is An Xx Year Old Patient Who Presents For...): Itchy skin
When Did You First Notice It? (The Patient First Noticed It...): 3 years
Where On Your Body Is It? (Located On...): Full body
Any Associated Symptoms? (The Symptoms Include.....): Dry
What Previous Treatments Have You Tried? (The Patient Has Tried The Following Treatments...): Moisturizer - no help
Did Anything Happen To Make You Want To Come In For This Specifically Today? (The Specific Reason That The Patient Came In Today Was Because:): Evaluation and treatment

## 2018-09-24 ENCOUNTER — LAB VISIT (OUTPATIENT)
Dept: LAB | Facility: HOSPITAL | Age: 63
End: 2018-09-24
Attending: INTERNAL MEDICINE
Payer: MEDICARE

## 2018-09-24 DIAGNOSIS — D51.8 OTHER VITAMIN B12 DEFICIENCY ANEMIAS: ICD-10-CM

## 2018-09-24 DIAGNOSIS — D63.8 ANEMIA, CHRONIC DISEASE: ICD-10-CM

## 2018-09-24 LAB
ALBUMIN SERPL BCP-MCNC: 3.6 G/DL
ALP SERPL-CCNC: 101 U/L
ALT SERPL W/O P-5'-P-CCNC: 11 U/L
ANION GAP SERPL CALC-SCNC: 7 MMOL/L
AST SERPL-CCNC: 10 U/L
BASOPHILS # BLD AUTO: 0.01 K/UL
BASOPHILS NFR BLD: 0.1 %
BILIRUB SERPL-MCNC: 0.4 MG/DL
BUN SERPL-MCNC: 15 MG/DL
CALCIUM SERPL-MCNC: 9.6 MG/DL
CHLORIDE SERPL-SCNC: 104 MMOL/L
CO2 SERPL-SCNC: 31 MMOL/L
CREAT SERPL-MCNC: 1 MG/DL
DIFFERENTIAL METHOD: ABNORMAL
EOSINOPHIL # BLD AUTO: 0.1 K/UL
EOSINOPHIL NFR BLD: 1.4 %
ERYTHROCYTE [DISTWIDTH] IN BLOOD BY AUTOMATED COUNT: 14.9 %
EST. GFR  (AFRICAN AMERICAN): >60 ML/MIN/1.73 M^2
EST. GFR  (NON AFRICAN AMERICAN): >60 ML/MIN/1.73 M^2
FERRITIN SERPL-MCNC: 128 NG/ML
GLUCOSE SERPL-MCNC: 208 MG/DL
HCT VFR BLD AUTO: 35.5 %
HGB BLD-MCNC: 11.4 G/DL
IRON SERPL-MCNC: 57 UG/DL
LYMPHOCYTES # BLD AUTO: 2.6 K/UL
LYMPHOCYTES NFR BLD: 31 %
MCH RBC QN AUTO: 27.3 PG
MCHC RBC AUTO-ENTMCNC: 32.1 G/DL
MCV RBC AUTO: 85 FL
MONOCYTES # BLD AUTO: 0.4 K/UL
MONOCYTES NFR BLD: 5.3 %
NEUTROPHILS # BLD AUTO: 5.1 K/UL
NEUTROPHILS NFR BLD: 62.2 %
PLATELET # BLD AUTO: 292 K/UL
PMV BLD AUTO: 9.7 FL
POTASSIUM SERPL-SCNC: 3.7 MMOL/L
PROT SERPL-MCNC: 7.5 G/DL
RBC # BLD AUTO: 4.18 M/UL
SATURATED IRON: 14 %
SODIUM SERPL-SCNC: 142 MMOL/L
TOTAL IRON BINDING CAPACITY: 403 UG/DL
TRANSFERRIN SERPL-MCNC: 272 MG/DL
VIT B12 SERPL-MCNC: 294 PG/ML
WBC # BLD AUTO: 8.28 K/UL

## 2018-09-24 PROCEDURE — 82607 VITAMIN B-12: CPT

## 2018-09-24 PROCEDURE — 36415 COLL VENOUS BLD VENIPUNCTURE: CPT | Mod: PO

## 2018-09-24 PROCEDURE — 80053 COMPREHEN METABOLIC PANEL: CPT | Mod: PO

## 2018-09-24 PROCEDURE — 83540 ASSAY OF IRON: CPT

## 2018-09-24 PROCEDURE — 82728 ASSAY OF FERRITIN: CPT

## 2018-09-24 PROCEDURE — 85025 COMPLETE CBC W/AUTO DIFF WBC: CPT | Mod: PO

## 2018-09-26 ENCOUNTER — OFFICE VISIT (OUTPATIENT)
Dept: HEMATOLOGY/ONCOLOGY | Facility: CLINIC | Age: 63
End: 2018-09-26
Payer: MEDICARE

## 2018-09-26 VITALS
TEMPERATURE: 99 F | BODY MASS INDEX: 37.51 KG/M2 | DIASTOLIC BLOOD PRESSURE: 66 MMHG | OXYGEN SATURATION: 98 % | WEIGHT: 239 LBS | HEART RATE: 93 BPM | HEIGHT: 67 IN | SYSTOLIC BLOOD PRESSURE: 108 MMHG

## 2018-09-26 DIAGNOSIS — Z91.89 AT HIGH RISK FOR CONSTIPATION: ICD-10-CM

## 2018-09-26 DIAGNOSIS — D63.8 ANEMIA, CHRONIC DISEASE: Primary | ICD-10-CM

## 2018-09-26 PROCEDURE — 99214 OFFICE O/P EST MOD 30 MIN: CPT | Mod: S$PBB,,, | Performed by: NURSE PRACTITIONER

## 2018-09-26 PROCEDURE — 99999 PR PBB SHADOW E&M-EST. PATIENT-LVL III: CPT | Mod: PBBFAC,,, | Performed by: NURSE PRACTITIONER

## 2018-09-26 PROCEDURE — 99213 OFFICE O/P EST LOW 20 MIN: CPT | Mod: PBBFAC,PO | Performed by: NURSE PRACTITIONER

## 2018-09-26 RX ORDER — DOCUSATE SODIUM 100 MG/1
100 CAPSULE, LIQUID FILLED ORAL DAILY PRN
Qty: 30 CAPSULE | Refills: 11 | Status: SHIPPED | OUTPATIENT
Start: 2018-09-26 | End: 2018-11-26

## 2018-09-26 RX ORDER — IRON,CARBONYL/ASCORBIC ACID 100-250 MG
1 TABLET ORAL DAILY
Qty: 30 TABLET | Refills: 11 | Status: SHIPPED | OUTPATIENT
Start: 2018-09-26 | End: 2019-10-18 | Stop reason: SDUPTHER

## 2018-09-26 NOTE — PATIENT INSTRUCTIONS
Iron Supplements  Most people think of iron as a metal that is used to make pots, frying pans, and soup kettles. This same metal (or mineral) also plays a vital role in the body. Iron helps the blood cells carry oxygen. When you dont get enough iron, you may feel tired and lack energy. Over time, without enough iron, your body makes fewer red blood cells. This can cause a condition called iron-deficiency anemia. Since your body doesnt make iron, you must get it from foods or supplements.     Food sources of iron  Iron is found in a few types of foods. Good sources include:  · Red meat, poultry, fish, eggs  · Dried fruits (especially raisins, prunes, figs)  · Legumes such as dried beans and lentils  · Breads and cereals with iron added  · Blackstrap molasses  · Spinach  · Foods cooked in cast-iron pans. This is especially true of acidic foods, such as tomatoes and rafael.   Why use a supplement?  Women often need additional iron because they lose menstrual blood during their period. But even grown men and boys may need a supplement at some point. You may need an iron supplement if any of the following is true for you:  · I rarely eat foods that are high in iron (such as red meat, poultry, dried beans, and foods with iron added).  · I am a vegetarian and I rarely eat legumes (dried beans, peas, lentils).  · I am a woman who has heavy menstrual periods.  · I am pregnant or breastfeeding.  · I have been diagnosed with iron-deficiency anemia.  If you take iron  Here are some tips to help you get the most from an iron supplement:  · Take it with vitamin C for better absorption.  · Dont take an iron supplement with milk. The calcium in milk limits iron absorption.  · Iron supplements can cause constipation. To prevent this, drink plenty of water, eat high-fiber foods, and exercise often. Also try an iron supplement with an added stool softener.  · Eat a healthy diet to get all the nutrients your body  needs.  Recommended iron intake  The amount of iron each person needs is different, and varies by age. Women who are pregnant or breastfeeding need extra iron. But taking more than the suggested amount is not always healthy. Your health care provider can help you choose the right amount of iron for you.   Date Last Reviewed: 6/2/2015  © 8379-1663 [employer]. [address] [place] [address], [place] [address]. All rights reserved. This information is not intended as a substitute for professional medical care. Always follow your healthcare professional's instructions.

## 2018-09-26 NOTE — ASSESSMENT & PLAN NOTE
Hemoglobin remains stable 11.4, MCV 85. CBC otherwise unremarkable. Saturated iron 14. Ferritin WNL. Trial of ICAR-C. F/U 6 weeks with CBC, CMP, Iron & TIBC, Ferritin, CRP 1-2 days prior. She knows to call sooner if issues, questions, concerns.

## 2018-09-26 NOTE — ASSESSMENT & PLAN NOTE
Prescribed ICar-C PO daily. Patient takes Norco for chronic pain.   Colace daily PRN constipation to prevent medication induced constipation  I had a detailed discussion with patient regarding side effects of oral iron replacement therapy. She knows to increase her fluid/fiber intake. She knows exercising will help. She knows Vitamin C helps absorb iron while diary may inhibit it's absorption

## 2018-09-26 NOTE — PROGRESS NOTES
Subjective:       Patient ID: Rose Leal is a 63 y.o. female.    Chief Complaint: Lab results, anemia    HPI: 63 y.o female comes to the clinic today for lab results and for anemia follow up. The patient has previously been seen in the hem/onc clinic by Dr. Trejo. Today is my first visit with the patient. I have reviewed all relevant clinical data available in the medical record and have utilized this in my evaluation and treatment recommendations today. The patient says she had a colonoscopy in 2017, outside the clinic and was told that it was okay.  This colonoscopy was done because of abdominal pain.  PMHx fibromyalgia and sarcoidosis diagnosed by bronchoscopy since 1992. Today the patient reports overall she feels well except chronic arthritic pain. She denies hematuria, hematochezia, melena, SOB, chest pain, syncope. She reports occasional opiate induced constipation. Hematological workup thus far have been negative. Most recent labs reveal hemoglobin 11.4, CBC otherwise unremarkable. Saturated Iron 14. Ferritin WNL.        Social History     Socioeconomic History    Marital status:      Spouse name: Not on file    Number of children: Not on file    Years of education: Not on file    Highest education level: Not on file   Social Needs    Financial resource strain: Not on file    Food insecurity - worry: Not on file    Food insecurity - inability: Not on file    Transportation needs - medical: Not on file    Transportation needs - non-medical: Not on file   Occupational History    Not on file   Tobacco Use    Smoking status: Never Smoker    Smokeless tobacco: Never Used   Substance and Sexual Activity    Alcohol use: Yes     Comment: occasional    Drug use: No    Sexual activity: Not on file   Other Topics Concern    Not on file   Social History Narrative    Not on file       Past Medical History:   Diagnosis Date    Arthritis     Blunt trauma, left eye     Carpal tunnel  syndrome     Diabetes mellitus     Hypertension     Neuromuscular disorder     LYLE (obstructive sleep apnea) Severe    Sarcoidosis of lung        Family History   Problem Relation Age of Onset    Cataracts Mother     Cataracts Maternal Grandmother     Glaucoma Maternal Grandmother        Past Surgical History:   Procedure Laterality Date    BREAST SURGERY      HYSTERECTOMY      NECK SURGERY         Review of Systems   Constitutional: Positive for fatigue. Negative for activity change, appetite change, chills, fever and unexpected weight change.   HENT: Negative for congestion, mouth sores, nosebleeds, rhinorrhea, sore throat, trouble swallowing and voice change.    Eyes: Negative for visual disturbance.   Respiratory: Negative for cough, chest tightness and shortness of breath.    Cardiovascular: Negative for chest pain and leg swelling.   Gastrointestinal: Positive for constipation (occasional). Negative for abdominal distention, abdominal pain, blood in stool, diarrhea, nausea and vomiting.   Genitourinary: Negative for difficulty urinating, dysuria and hematuria.   Musculoskeletal: Positive for arthralgias and myalgias.   Skin: Negative for rash.   Neurological: Negative for dizziness, weakness and headaches.   Hematological: Negative for adenopathy. Does not bruise/bleed easily.   Psychiatric/Behavioral: The patient is nervous/anxious.             Medication List           Accurate as of 9/26/18  2:16 PM. If you have any questions, ask your nurse or doctor.               START taking these medications    docusate sodium 100 MG capsule  Commonly known as:  COLACE  Take 1 capsule (100 mg total) by mouth daily as needed for Constipation.  Started by:  Jenny Blount NP     iron-vitamin C 100-250 mg (ICAR-C) 100-250 mg Tab  Commonly known as:  ICAR-C  Take 1 tablet by mouth once daily.  Started by:  Jenny Blount NP        CONTINUE taking these medications    albuterol 90 mcg/actuation  inhaler  Commonly known as:  PROVENTIL/VENTOLIN HFA  Inhale 2 puffs into the lungs every 4 (four) hours as needed for Wheezing.     amitriptyline 25 MG tablet  Commonly known as:  ELAVIL  Take 1 tablet (25 mg total) by mouth every evening.     amlodipine-benazepril 10-40 mg per capsule  Commonly known as:  LOTREL     carvedilol 6.25 MG tablet  Commonly known as:  COREG     cyclobenzaprine 10 MG tablet  Commonly known as:  FLEXERIL     gabapentin 300 MG capsule  Commonly known as:  NEURONTIN  Take 1 capsule (300 mg total) by mouth 3 (three) times daily.     glimepiride 1 MG tablet  Commonly known as:  AMARYL     HYDROcodone-acetaminophen  mg per tablet  Commonly known as:  NORCO     ibuprofen 800 MG tablet  Commonly known as:  ADVIL,MOTRIN     NexIUM 40 MG capsule  Generic drug:  esomeprazole     pravastatin 40 MG tablet  Commonly known as:  PRAVACHOL        STOP taking these medications    ergocalciferol 50,000 unit Cap  Commonly known as:  ERGOCALCIFEROL  Stopped by:  Jenny Blount NP           Where to Get Your Medications      These medications were sent to [id number] - [place], [place] - [address] AT [facility] OF [address]  [address], [place] [address]    Phone:  [phone]   · docusate sodium 100 MG capsule  · iron-vitamin C 100-250 mg (ICAR-C) 100-250 mg Tab       Objective:     Vitals:    09/26/18 1306   BP: 108/66   Pulse: 93   Temp: 98.7 °F (37.1 °C)       Physical Exam   Constitutional: She is oriented to person, place, and time. Vital signs are normal. She appears well-developed and well-nourished. She is cooperative.   HENT:   Head: Normocephalic.   Right Ear: Hearing normal.   Left Ear: Hearing normal.   Nose: Nose normal.   Mouth/Throat: Oropharynx is clear and moist.   Eyes: Conjunctivae, EOM and lids are normal. Right eye exhibits no discharge. Left eye exhibits no discharge.   Neck: Normal range of motion. No thyroid mass present.   Cardiovascular: Normal rate,  regular rhythm and normal heart sounds.   No murmur heard.  Pulmonary/Chest: Effort normal and breath sounds normal. No respiratory distress. She has no wheezes. She has no rales.   Abdominal: Soft. Bowel sounds are normal. She exhibits no distension. There is no hepatosplenomegaly. There is no tenderness.   Musculoskeletal: Normal range of motion. She exhibits no edema.   Lymphadenopathy:        Head (right side): No preauricular and no posterior auricular adenopathy present.        Head (left side): No preauricular and no posterior auricular adenopathy present.        Right cervical: No superficial cervical adenopathy present.       Left cervical: No superficial cervical adenopathy present.   Neurological: She is alert and oriented to person, place, and time.   Skin: Skin is warm, dry and intact.   Psychiatric: Her speech is normal and behavior is normal. Thought content normal. Her mood appears anxious.   Vitals reviewed.       Assessment:     Problem List Items Addressed This Visit     Anemia, chronic disease - Primary     Hemoglobin remains stable 11.4, MCV 85. CBC otherwise unremarkable. Saturated iron 14. Ferritin WNL. Trial of ICAR-C. F/U 6 weeks with CBC, CMP, Iron & TIBC, Ferritin, CRP 1-2 days prior. She knows to call sooner if issues, questions, concerns.         Relevant Medications    iron-vitamin C 100-250 mg, ICAR-C, (ICAR-C) 100-250 mg Tab    Other Relevant Orders    CBC auto differential    C-reactive protein    Comprehensive metabolic panel    Ferritin    Iron and TIBC    At high risk for constipation     Prescribed ICar-C PO daily. Patient takes Norco for chronic pain.   Colace daily PRN constipation to prevent medication induced constipation  I had a detailed discussion with patient regarding side effects of oral iron replacement therapy. She knows to increase her fluid/fiber intake. She knows exercising will help. She knows Vitamin C helps absorb iron while diary may inhibit it's absorption          Relevant Medications    docusate sodium (COLACE) 100 MG capsule            Plan:     Anemia, chronic disease  -     CBC auto differential; Future; Expected date: 09/26/2018  -     C-reactive protein; Future; Expected date: 09/26/2018  -     Comprehensive metabolic panel; Future; Expected date: 09/26/2018  -     Ferritin; Future; Expected date: 09/26/2018  -     Iron and TIBC; Future; Expected date: 09/26/2018  -     iron-vitamin C 100-250 mg, ICAR-C, (ICAR-C) 100-250 mg Tab; Take 1 tablet by mouth once daily.  Dispense: 30 tablet; Refill: 11    At high risk for constipation  -     docusate sodium (COLACE) 100 MG capsule; Take 1 capsule (100 mg total) by mouth daily as needed for Constipation.  Dispense: 30 capsule; Refill: 11          I will review assessment/plan with collaborating physician     NORMAN Fuentes

## 2018-10-05 ENCOUNTER — PROCEDURE VISIT (OUTPATIENT)
Dept: PULMONOLOGY | Facility: CLINIC | Age: 63
End: 2018-10-05
Payer: MEDICARE

## 2018-10-05 ENCOUNTER — HOSPITAL ENCOUNTER (OUTPATIENT)
Dept: RADIOLOGY | Facility: HOSPITAL | Age: 63
Discharge: HOME | End: 2018-10-05
Attending: NURSE PRACTITIONER
Payer: MEDICARE

## 2018-10-05 ENCOUNTER — OFFICE VISIT (OUTPATIENT)
Dept: PULMONOLOGY | Facility: CLINIC | Age: 63
End: 2018-10-05
Payer: MEDICARE

## 2018-10-05 VITALS
HEIGHT: 67 IN | DIASTOLIC BLOOD PRESSURE: 80 MMHG | BODY MASS INDEX: 37.35 KG/M2 | WEIGHT: 238 LBS | HEART RATE: 76 BPM | RESPIRATION RATE: 17 BRPM | SYSTOLIC BLOOD PRESSURE: 132 MMHG

## 2018-10-05 DIAGNOSIS — D86.9 SARCOIDOSIS: ICD-10-CM

## 2018-10-05 DIAGNOSIS — D86.9 SARCOIDOSIS: Primary | ICD-10-CM

## 2018-10-05 DIAGNOSIS — G47.33 OSA (OBSTRUCTIVE SLEEP APNEA): ICD-10-CM

## 2018-10-05 LAB
BRPFT: ABNORMAL
DLCO ADJ PRE: 16.63 ML/(MIN*MMHG) (ref 18.39–29.86)
DLCO SINGLE BREATH LLN: 18.39
DLCO SINGLE BREATH PRE REF: 64.3 %
DLCO SINGLE BREATH REF: 24.12
DLCOC SBVA LLN: 3.11
DLCOC SBVA PRE REF: 113.8 %
DLCOC SBVA REF: 4.44
DLCOC SINGLE BREATH LLN: 18.39
DLCOC SINGLE BREATH PRE REF: 68.9 %
DLCOC SINGLE BREATH REF: 24.12
DLCOVA LLN: 3.11
DLCOVA PRE REF: 106.2 %
DLCOVA PRE: 4.72 ML/(MIN*MMHG*L) (ref 3.11–5.77)
DLCOVA REF: 4.44
DLVAADJ PRE: 5.06 ML/(MIN*MMHG*L) (ref 3.11–5.77)
ERV LLN: 0.79
ERV PRE REF: 82.7 %
ERV REF: 0.79
ERVN2 LLN: 0.79
ERVN2 REF: 0.79
FEF 25 75 CHG: 2.4 %
FEF 25 75 LLN: 0.82
FEF 25 75 POST REF: 88.3 %
FEF 25 75 PRE REF: 86.3 %
FEF 25 75 REF: 1.99
FET100 CHG: -9.3 %
FEV1 CHG: -2 %
FEV1 FVC CHG: 1.6 %
FEV1 FVC LLN: 67
FEV1 FVC POST REF: 99.3 %
FEV1 FVC PRE REF: 97.8 %
FEV1 FVC REF: 79
FEV1 LLN: 1.63
FEV1 POST REF: 81.3 %
FEV1 PRE REF: 82.9 %
FEV1 REF: 2.27
FEV6 CHG: -1.9 %
FEV6 LLN: 2.05
FEV6 POST REF: 81.6 %
FEV6 POST: 2.3 L (ref 2.05–3.58)
FEV6 PRE REF: 83.1 %
FEV6 PRE: 2.34 L (ref 2.05–3.58)
FEV6 REF: 2.82
FRCN2 LLN: 2.05
FRCN2 REF: 2.87
FRCPLETH LLN: 2.05
FRCPLETH PREREF: 91.6 %
FRCPLETH REF: 2.87
FVC CHG: -3.5 %
FVC LLN: 2.1
FVC POST REF: 81.3 %
FVC PRE REF: 84.2 %
FVC REF: 2.89
IVC PRE: 1.95 L (ref 2.1–3.68)
IVC SINGLE BREATH LLN: 2.1
IVC SINGLE BREATH PRE REF: 67.3 %
IVC SINGLE BREATH REF: 2.89
MVV LLN: 86
MVV PRE REF: 40.4 %
MVV REF: 102
PEF CHG: 12.9 %
PEF LLN: 3.69
PEF POST REF: 83.8 %
PEF PRE REF: 74.2 %
PEF REF: 5.88
POST FEF 25 75: 1.76 L/S (ref 0.82–3.16)
POST FET 100: 8.42 SEC
POST FEV1 FVC: 78.43 % (ref 66.92–91.01)
POST FEV1: 1.84 L (ref 1.63–2.91)
POST FVC: 2.35 L (ref 2.1–3.68)
POST PEF: 4.93 L/S (ref 3.69–8.08)
PRE DLCO: 15.51 ML/(MIN*MMHG) (ref 18.39–29.86)
PRE ERV: 0.65 L (ref 0.79–0.79)
PRE FEF 25 75: 1.72 L/S (ref 0.82–3.16)
PRE FET 100: 9.29 SEC
PRE FEV1 FVC: 77.22 % (ref 66.92–91.01)
PRE FEV1: 1.88 L (ref 1.63–2.91)
PRE FRC PL: 2.63 L
PRE FVC: 2.44 L (ref 2.1–3.68)
PRE MVV: 41 L/MIN (ref 86.36–116.84)
PRE PEF: 4.37 L/S (ref 3.69–8.08)
PRE RV: 1.9 L (ref 1.51–2.66)
PRE TLC: 4.33 L (ref 4.44–6.42)
RAW LLN: 3.06
RAW PRE REF: 180 %
RAW PRE: 5.51 CMH2O*S/L (ref 3.06–3.06)
RAW REF: 3.06
RV LLN: 1.51
RV PRE REF: 90.9 %
RV REF: 2.09
RVN2 LLN: 1.51
RVN2 REF: 2.09
RVN2TLCN2 LLN: 31
RVN2TLCN2 REF: 40
RVTLC LLN: 31
RVTLC PRE REF: 108.4 %
RVTLC PRE: 43.75 % (ref 30.79–49.97)
RVTLC REF: 40
TLC LLN: 4.44
TLC PRE REF: 79.8 %
TLC REF: 5.43
TLCN2 LLN: 4.44
TLCN2 REF: 5.43
VA PRE: 3.29 L (ref 5.28–5.28)
VA SINGLE BREATH LLN: 5.28
VA SINGLE BREATH PRE REF: 62.3 %
VA SINGLE BREATH REF: 5.28
VC LLN: 2.1
VC PRE REF: 84.2 %
VC PRE: 2.44 L (ref 2.1–3.68)
VC REF: 2.89
VCMAXN2 LLN: 2.1
VCMAXN2 REF: 2.89
VTGRAWPRE: 2.62 L

## 2018-10-05 PROCEDURE — 94060 EVALUATION OF WHEEZING: CPT | Mod: PBBFAC,PO

## 2018-10-05 PROCEDURE — 94729 DIFFUSING CAPACITY: CPT | Mod: 26,S$PBB,, | Performed by: INTERNAL MEDICINE

## 2018-10-05 PROCEDURE — 94726 PLETHYSMOGRAPHY LUNG VOLUMES: CPT | Mod: PBBFAC,PO

## 2018-10-05 PROCEDURE — 90686 IIV4 VACC NO PRSV 0.5 ML IM: CPT | Mod: PBBFAC,PO

## 2018-10-05 PROCEDURE — 71046 X-RAY EXAM CHEST 2 VIEWS: CPT | Mod: 26,,, | Performed by: RADIOLOGY

## 2018-10-05 PROCEDURE — 94726 PLETHYSMOGRAPHY LUNG VOLUMES: CPT | Mod: 26,S$PBB,, | Performed by: INTERNAL MEDICINE

## 2018-10-05 PROCEDURE — 99214 OFFICE O/P EST MOD 30 MIN: CPT | Mod: S$PBB,,, | Performed by: NURSE PRACTITIONER

## 2018-10-05 PROCEDURE — 94729 DIFFUSING CAPACITY: CPT | Mod: PBBFAC,PO

## 2018-10-05 PROCEDURE — 94060 EVALUATION OF WHEEZING: CPT | Mod: 26,S$PBB,, | Performed by: INTERNAL MEDICINE

## 2018-10-05 PROCEDURE — 99999 PR PBB SHADOW E&M-EST. PATIENT-LVL IV: CPT | Mod: PBBFAC,,, | Performed by: NURSE PRACTITIONER

## 2018-10-05 PROCEDURE — 71046 X-RAY EXAM CHEST 2 VIEWS: CPT | Mod: TC,FY,PO

## 2018-10-05 PROCEDURE — 99214 OFFICE O/P EST MOD 30 MIN: CPT | Mod: PBBFAC,25,PO | Performed by: NURSE PRACTITIONER

## 2018-10-05 NOTE — PROGRESS NOTES
"Subjective:      Patient ID: Rose Leal is a 63 y.o. female.    Chief Complaint: Sarcoidosis    Patient with sleep apnea and sarcoidosis. No fever, chills, or hemoptysis. No pleuritic type chest pain. Breathing is stable as compared to 6 months ago.      Sarcoid diagnosed by bronchoscopy 1992 by Dr. Villegas    Patient Active Problem List:     Multiple joint pain     H/O sarcoidosis     Obstructive sleep apnea syndrome     CRP elevated     Parotid gland enlargement     Sicca syndrome     Sarcoidosis     Vitamin D deficiency     Fibromyalgia     Inadequate sleep hygiene     Anemia, chronic disease     Other vitamin B12 deficiency anemias     Forgetfulness     At high risk for constipation              /80   Pulse 76   Resp 17   Ht 5' 7" (1.702 m)   Wt 108 kg (238 lb)   BMI 37.28 kg/m²   Body mass index is 37.28 kg/m².    Review of Systems   Constitutional: Negative.    HENT: Negative.    Respiratory: Negative.    Cardiovascular: Negative.    Musculoskeletal: Positive for arthralgias.   Gastrointestinal: Negative.    Neurological: Negative.    Psychiatric/Behavioral: Negative.      Objective:      Physical Exam   Constitutional: She is oriented to person, place, and time. She appears well-developed and well-nourished.   Obese   HENT:   Head: Normocephalic and atraumatic.   Mouth/Throat: Oropharynx is clear and moist.   Neck: Normal range of motion. Neck supple.   Cardiovascular: Normal rate and regular rhythm. Exam reveals no gallop.   No murmur heard.  Pulmonary/Chest: Effort normal and breath sounds normal.   Abdominal: Soft. She exhibits no mass. There is no tenderness.   Musculoskeletal: Normal range of motion. She exhibits no edema.   Neurological: She is alert and oriented to person, place, and time.   Skin: Skin is warm and dry.   Psychiatric: She has a normal mood and affect.     Personal Diagnostic Review  PFT stable    Results for orders placed during the hospital encounter of 10/05/18   X-Ray " Chest PA And Lateral    Narrative EXAMINATION:  XR CHEST PA AND LATERAL    CLINICAL HISTORY:  Sarcoidosis, unspecified    TECHNIQUE:  PA and lateral views of the chest were performed.    COMPARISON:  October 9, 2017    FINDINGS:  Cardiomediastinal contour is stable in appearance.  Heart size within upper limits normal in the aorta is tortuous.  Trachea is midline.  Pulmonary vasculatures symmetric.  Osseous structures appear intact with degenerative change throughout the spine and shoulders.  Postsurgical changes in the midline lower C-spine.  Status post cholecystectomy.  CP angles are clear.      Impression Stable exam without acute infiltrate.      Electronically signed by: Antoni Escalona MD  Date:    10/05/2018  Time:    09:18         Assessment:       1. Sarcoidosis    2. LYLE (obstructive sleep apnea)        Outpatient Encounter Medications as of 10/5/2018   Medication Sig Dispense Refill    albuterol 90 mcg/actuation inhaler Inhale 2 puffs into the lungs every 4 (four) hours as needed for Wheezing. 1 Inhaler 2    amitriptyline (ELAVIL) 25 MG tablet Take 1 tablet (25 mg total) by mouth every evening. 90 tablet 3    amlodipine-benazepril (LOTREL) 10-40 mg per capsule Take 1 capsule by mouth once daily.  0    carvedilol (COREG) 6.25 MG tablet Take 6.25 mg by mouth 2 (two) times daily with meals.  0    cyclobenzaprine (FLEXERIL) 10 MG tablet   0    docusate sodium (COLACE) 100 MG capsule Take 1 capsule (100 mg total) by mouth daily as needed for Constipation. 30 capsule 11    gabapentin (NEURONTIN) 300 MG capsule Take 1 capsule (300 mg total) by mouth 3 (three) times daily. 90 capsule 3    glimepiride (AMARYL) 1 MG tablet 2 mg once daily.  0    HYDROcodone-acetaminophen (NORCO)  mg per tablet Take 1 tablet by mouth every 12 (twelve) hours as needed for Pain.      ibuprofen (ADVIL,MOTRIN) 800 MG tablet Take 800 mg by mouth 3 (three) times daily.      iron-vitamin C 100-250 mg, ICAR-C, (ICAR-C)  100-250 mg Tab Take 1 tablet by mouth once daily. 30 tablet 11    NEXIUM 40 mg capsule 40 mg once daily.  0    pravastatin (PRAVACHOL) 40 MG tablet Take 40 mg by mouth once daily.  0     No facility-administered encounter medications on file as of 10/5/2018.      Orders Placed This Encounter   Procedures    CPAP/BIPAP SUPPLIES     Order Specific Question:   Type of mask:     Answer:   Nasal     Order Specific Question:   Headgear?     Answer:   Yes     Order Specific Question:   Tubing?     Answer:   Yes     Order Specific Question:   Humidifier chamber?     Answer:   Yes     Order Specific Question:   Chin strap?     Answer:   Yes     Order Specific Question:   Filters?     Answer:   Yes     Order Specific Question:   Length of need (1-99 months):     Answer:   99    CPAP/BIPAP SUPPLIES     Order Specific Question:   Type of mask:     Answer:   FFM     Order Specific Question:   Headgear?     Answer:   Yes     Order Specific Question:   Tubing?     Answer:   Yes     Order Specific Question:   Humidifier chamber?     Answer:   Yes     Order Specific Question:   Chin strap?     Answer:   Yes     Order Specific Question:   Filters?     Answer:   Yes     Order Specific Question:   Length of need (1-99 months):     Answer:   99    X-Ray Chest PA And Lateral     Standing Status:   Future     Standing Expiration Date:   10/5/2019     Order Specific Question:   May the Radiologist modify the order per protocol to meet the clinical needs of the patient?     Answer:   Yes    Influenza - Quadrivalent (3 years & older) (PF)    Complete PFT with bronchodilator     Standing Status:   Future     Standing Expiration Date:   10/5/2019     Plan:   Bring CPAP for download on next visit. Follow up in 6 months.   In one year, CXR, PFT

## 2018-11-08 ENCOUNTER — TELEPHONE (OUTPATIENT)
Dept: INFUSION THERAPY | Facility: HOSPITAL | Age: 63
End: 2018-11-08

## 2018-11-08 ENCOUNTER — LAB VISIT (OUTPATIENT)
Dept: LAB | Facility: HOSPITAL | Age: 63
End: 2018-11-08
Attending: NURSE PRACTITIONER
Payer: MEDICARE

## 2018-11-08 DIAGNOSIS — D63.8 ANEMIA, CHRONIC DISEASE: ICD-10-CM

## 2018-11-08 LAB
ALBUMIN SERPL BCP-MCNC: 3.7 G/DL
ALP SERPL-CCNC: 106 U/L
ALT SERPL W/O P-5'-P-CCNC: 7 U/L
ANION GAP SERPL CALC-SCNC: 15 MMOL/L
AST SERPL-CCNC: 10 U/L
BASOPHILS # BLD AUTO: 0.01 K/UL
BASOPHILS NFR BLD: 0.1 %
BILIRUB SERPL-MCNC: 0.3 MG/DL
BUN SERPL-MCNC: 19 MG/DL
CALCIUM SERPL-MCNC: 9.6 MG/DL
CHLORIDE SERPL-SCNC: 106 MMOL/L
CO2 SERPL-SCNC: 23 MMOL/L
CREAT SERPL-MCNC: 1 MG/DL
CRP SERPL-MCNC: 19.4 MG/L
DIFFERENTIAL METHOD: ABNORMAL
EOSINOPHIL # BLD AUTO: 0.1 K/UL
EOSINOPHIL NFR BLD: 1.7 %
ERYTHROCYTE [DISTWIDTH] IN BLOOD BY AUTOMATED COUNT: 14.9 %
EST. GFR  (AFRICAN AMERICAN): >60 ML/MIN/1.73 M^2
EST. GFR  (NON AFRICAN AMERICAN): >60 ML/MIN/1.73 M^2
FERRITIN SERPL-MCNC: 93 NG/ML
GLUCOSE SERPL-MCNC: 140 MG/DL
HAPTOGLOB SERPL-MCNC: 285 MG/DL
HCT VFR BLD AUTO: 36 %
HGB BLD-MCNC: 11.6 G/DL
IRON SERPL-MCNC: 53 UG/DL
LYMPHOCYTES # BLD AUTO: 2.4 K/UL
LYMPHOCYTES NFR BLD: 34.9 %
MCH RBC QN AUTO: 27.5 PG
MCHC RBC AUTO-ENTMCNC: 32.2 G/DL
MCV RBC AUTO: 85 FL
MONOCYTES # BLD AUTO: 0.4 K/UL
MONOCYTES NFR BLD: 5.7 %
NEUTROPHILS # BLD AUTO: 4 K/UL
NEUTROPHILS NFR BLD: 57.6 %
PLATELET # BLD AUTO: 285 K/UL
PMV BLD AUTO: 9.8 FL
POTASSIUM SERPL-SCNC: 3.8 MMOL/L
PROT SERPL-MCNC: 7.6 G/DL
RBC # BLD AUTO: 4.22 M/UL
SATURATED IRON: 13 %
SODIUM SERPL-SCNC: 144 MMOL/L
TOTAL IRON BINDING CAPACITY: 397 UG/DL
TRANSFERRIN SERPL-MCNC: 268 MG/DL
WBC # BLD AUTO: 6.97 K/UL

## 2018-11-08 PROCEDURE — 85025 COMPLETE CBC W/AUTO DIFF WBC: CPT | Mod: PO

## 2018-11-08 PROCEDURE — 83540 ASSAY OF IRON: CPT

## 2018-11-08 PROCEDURE — 82728 ASSAY OF FERRITIN: CPT

## 2018-11-08 PROCEDURE — 86140 C-REACTIVE PROTEIN: CPT

## 2018-11-08 PROCEDURE — 80053 COMPREHEN METABOLIC PANEL: CPT | Mod: PO

## 2018-11-08 PROCEDURE — 36415 COLL VENOUS BLD VENIPUNCTURE: CPT | Mod: PO

## 2018-11-08 PROCEDURE — 83010 ASSAY OF HAPTOGLOBIN QUANT: CPT

## 2018-11-08 PROCEDURE — 83021 HEMOGLOBIN CHROMOTOGRAPHY: CPT

## 2018-11-08 NOTE — TELEPHONE ENCOUNTER
----- Message from Mariela Baker sent at 11/8/2018  3:03 PM CST -----  Contact: [phone]  Pt is calling to request a call back to reschedule her appt missed. Please schedule an morning appt like 10ish.      Thank you!

## 2018-11-09 LAB
HGB A2 MFR BLD HPLC: 2.3 %
HGB FRACT BLD ELPH-IMP: NORMAL
HGB FRACT BLD ELPH-IMP: NORMAL

## 2018-11-19 LAB
ALPHA GLOBIN RELEASED BY: NORMAL
ALPHA-GLOBIN SPECIMEN: NORMAL
GENETICIST REVIEW: NORMAL
HBA1 GENE MUT ANL BLD/T: NEGATIVE
HBA1 GENE MUT ANL BLD/T: NORMAL
REF LAB TEST METHOD: NORMAL
SERVICE CMNT-IMP: NORMAL
SPECIMEN SOURCE: NORMAL

## 2018-11-26 ENCOUNTER — OFFICE VISIT (OUTPATIENT)
Dept: HEMATOLOGY/ONCOLOGY | Facility: CLINIC | Age: 63
End: 2018-11-26
Payer: MEDICARE

## 2018-11-26 ENCOUNTER — LAB VISIT (OUTPATIENT)
Dept: LAB | Facility: HOSPITAL | Age: 63
End: 2018-11-26
Attending: NURSE PRACTITIONER
Payer: MEDICARE

## 2018-11-26 VITALS
DIASTOLIC BLOOD PRESSURE: 77 MMHG | SYSTOLIC BLOOD PRESSURE: 121 MMHG | BODY MASS INDEX: 37.05 KG/M2 | OXYGEN SATURATION: 98 % | TEMPERATURE: 98 F | WEIGHT: 236.56 LBS | HEART RATE: 96 BPM

## 2018-11-26 DIAGNOSIS — D51.8 OTHER VITAMIN B12 DEFICIENCY ANEMIAS: ICD-10-CM

## 2018-11-26 DIAGNOSIS — D63.8 ANEMIA, CHRONIC DISEASE: Primary | ICD-10-CM

## 2018-11-26 DIAGNOSIS — D86.9 SARCOIDOSIS: ICD-10-CM

## 2018-11-26 DIAGNOSIS — E55.9 VITAMIN D DEFICIENCY: ICD-10-CM

## 2018-11-26 DIAGNOSIS — D63.8 ANEMIA, CHRONIC DISEASE: ICD-10-CM

## 2018-11-26 LAB — 25(OH)D3+25(OH)D2 SERPL-MCNC: 11 NG/ML

## 2018-11-26 PROCEDURE — 99213 OFFICE O/P EST LOW 20 MIN: CPT | Mod: PBBFAC,PO | Performed by: NURSE PRACTITIONER

## 2018-11-26 PROCEDURE — 82306 VITAMIN D 25 HYDROXY: CPT

## 2018-11-26 PROCEDURE — 99214 OFFICE O/P EST MOD 30 MIN: CPT | Mod: S$PBB,,, | Performed by: NURSE PRACTITIONER

## 2018-11-26 PROCEDURE — 36415 COLL VENOUS BLD VENIPUNCTURE: CPT | Mod: PO

## 2018-11-26 PROCEDURE — 99999 PR PBB SHADOW E&M-EST. PATIENT-LVL III: CPT | Mod: PBBFAC,,, | Performed by: NURSE PRACTITIONER

## 2018-11-26 RX ORDER — POLYETHYLENE GLYCOL 3350 17 G/17G
17 POWDER, FOR SOLUTION ORAL DAILY
Qty: 510 G | Refills: 11 | Status: SHIPPED | OUTPATIENT
Start: 2018-11-26 | End: 2019-11-26

## 2018-11-26 RX ORDER — DOCUSATE SODIUM 100 MG/1
100 CAPSULE, LIQUID FILLED ORAL 2 TIMES DAILY
Qty: 60 CAPSULE | Refills: 11 | Status: SHIPPED | OUTPATIENT
Start: 2018-11-26 | End: 2019-11-26

## 2018-11-26 NOTE — ASSESSMENT & PLAN NOTE
On our last visit we decided we would try oral iron replacement therapy to see if it would improve her anemia.  The patient tells me that she took ICAR-C for approximately 1 week and developed constipation and stop taking.    Will try ICAR-C.  I have increased her Colace to 100 mg p.o. b.i.d. I have also added MiraLax 17 g p.o. daily to help prevent constipation while on oral iron replacement therapy.  I have also added vitamin-D level per patient's request.    F/U in 2 months with labs 1-2 days prior.  We will call patient with vitamin-D results and initiate vitamin D supplementation if found to be deficient.

## 2018-11-26 NOTE — PROGRESS NOTES
Subjective:       Patient ID: Rose Leal is a 63 y.o. female.    Chief Complaint: Lab results, anemia    HPI: 63 y.o female comes to the clinic today for lab results and for anemia follow up. The patient has previously been seen in the hem/onc clinic by Dr. Trejo. Today is my first visit with the patient. I have reviewed all relevant clinical data available in the medical record and have utilized this in my evaluation and treatment recommendations today. The patient says she had a colonoscopy in 2017, outside the clinic and was told that it was okay.  This colonoscopy was done because of abdominal pain.  PMHx fibromyalgia and sarcoidosis diagnosed by bronchoscopy since 1992, managed by Pulmonary disease. Hematological workup thus far have been negative. Most recent labs reveal hemoglobin 11.4, CBC otherwise unremarkable. Saturated Iron 14. Ferritin WNL.      Today's Visit:  Today the patient reports overall she feels well except chronic arthritic pain. She denies hematuria, hematochezia, melena, SOB, chest pain, syncope. She reports occasional opiate induced constipation, which worsened approximately 1 week after initiating ICAR-C despite taking Colace 100 mg p.o. daily.  I had a detailed discussion with patient regarding this.  I encouraged her to increase her fluid intake to at least 8-10 8 oz glasses per day.  I also encouraged her to increase high-fiber foods and daily activity.  Also discussed with the patient increasing Colace to 100 mg p.o. b.i.d. and adding MiraLax daily to her regimen to help prevent constipation while we do a retrial of ICAR-C.  The patient does report a history of low vitamin-D level which she states was never treated.  Review of the medical record did reveal low vitamin-D level approximately 1 year ago.  I will recheck her vitamin-D level today in follow-up via phone call if she remains deficient to initiate vitamin-D replacement.    Social History     Socioeconomic History     Marital status:      Spouse name: Not on file    Number of children: Not on file    Years of education: Not on file    Highest education level: Not on file   Social Needs    Financial resource strain: Not on file    Food insecurity - worry: Not on file    Food insecurity - inability: Not on file    Transportation needs - medical: Not on file    Transportation needs - non-medical: Not on file   Occupational History    Not on file   Tobacco Use    Smoking status: Never Smoker    Smokeless tobacco: Never Used   Substance and Sexual Activity    Alcohol use: Yes     Comment: occasional    Drug use: No    Sexual activity: Not on file   Other Topics Concern    Not on file   Social History Narrative    Not on file       Past Medical History:   Diagnosis Date    Arthritis     Blunt trauma, left eye     Carpal tunnel syndrome     Diabetes mellitus     Hypertension     Neuromuscular disorder     LYLE (obstructive sleep apnea) Severe    Sarcoidosis of lung        Family History   Problem Relation Age of Onset    Cataracts Mother     Cataracts Maternal Grandmother     Glaucoma Maternal Grandmother        Past Surgical History:   Procedure Laterality Date    BREAST SURGERY      HYSTERECTOMY      NECK SURGERY         Review of Systems   Constitutional: Negative for activity change, appetite change, chills, fatigue, fever and unexpected weight change.   HENT: Negative for congestion, mouth sores, nosebleeds, rhinorrhea, sore throat, trouble swallowing and voice change.    Eyes: Negative for visual disturbance.   Respiratory: Negative for cough, chest tightness and shortness of breath.    Cardiovascular: Negative for chest pain and leg swelling.   Gastrointestinal: Positive for constipation (occasional). Negative for abdominal distention, abdominal pain, blood in stool, diarrhea, nausea and vomiting.   Genitourinary: Negative for difficulty urinating, dysuria and hematuria.   Musculoskeletal:  Positive for arthralgias and myalgias.   Skin: Negative for rash.   Neurological: Negative for dizziness, weakness and headaches.   Hematological: Negative for adenopathy. Does not bruise/bleed easily.   Psychiatric/Behavioral: The patient is nervous/anxious.             Medication List           Accurate as of 11/26/18 12:15 PM. If you have any questions, ask your nurse or doctor.               START taking these medications    polyethylene glycol 17 gram/dose powder  Commonly known as:  GLYCOLAX  Take 17 g by mouth once daily. for 365 doses  Started by:  Jenny Blount NP        CHANGE how you take these medications    docusate sodium 100 MG capsule  Commonly known as:  COLACE  Take 1 capsule (100 mg total) by mouth 2 (two) times daily.  What changed:    · when to take this  · reasons to take this  Changed by:  Jenny Blount NP        CONTINUE taking these medications    albuterol 90 mcg/actuation inhaler  Commonly known as:  PROVENTIL/VENTOLIN HFA  Inhale 2 puffs into the lungs every 4 (four) hours as needed for Wheezing.     amitriptyline 25 MG tablet  Commonly known as:  ELAVIL  Take 1 tablet (25 mg total) by mouth every evening.     amlodipine-benazepril 10-40 mg per capsule  Commonly known as:  LOTREL     carvedilol 6.25 MG tablet  Commonly known as:  COREG     cyclobenzaprine 10 MG tablet  Commonly known as:  FLEXERIL     gabapentin 300 MG capsule  Commonly known as:  NEURONTIN  Take 1 capsule (300 mg total) by mouth 3 (three) times daily.     glimepiride 1 MG tablet  Commonly known as:  AMARYL     HYDROcodone-acetaminophen  mg per tablet  Commonly known as:  NORCO     ibuprofen 800 MG tablet  Commonly known as:  ADVIL,MOTRIN     iron-vitamin C 100-250 mg (ICAR-C) 100-250 mg Tab  Commonly known as:  ICAR-C  Take 1 tablet by mouth once daily.     NexIUM 40 MG capsule  Generic drug:  esomeprazole     pravastatin 40 MG tablet  Commonly known as:  PRAVACHOL           Where to Get Your Medications       These medications were sent to [facility] - [place], [place] - [address]  [address], [place] [address]    Phone:  [phone]   · docusate sodium 100 MG capsule  · polyethylene glycol 17 gram/dose powder       Objective:     Vitals:    11/26/18 0944   BP: 121/77   Pulse: 96   Temp: 98 °F (36.7 °C)       Physical Exam   Constitutional: She is oriented to person, place, and time. Vital signs are normal. She appears well-developed and well-nourished. She is cooperative.   HENT:   Head: Normocephalic.   Right Ear: Hearing normal.   Left Ear: Hearing normal.   Nose: Nose normal.   Mouth/Throat: Oropharynx is clear and moist.   Eyes: Conjunctivae, EOM and lids are normal. Right eye exhibits no discharge. Left eye exhibits no discharge.   Neck: Normal range of motion. No thyroid mass present.   Cardiovascular: Normal rate, regular rhythm and normal heart sounds.   No murmur heard.  Pulmonary/Chest: Effort normal and breath sounds normal. No respiratory distress. She has no wheezes. She has no rhonchi. She has no rales.   Abdominal: Soft. Bowel sounds are normal. She exhibits no distension. There is no hepatosplenomegaly. There is no tenderness.   Musculoskeletal: Normal range of motion. She exhibits no edema.   Lymphadenopathy:        Head (right side): No preauricular and no posterior auricular adenopathy present.        Head (left side): No preauricular and no posterior auricular adenopathy present.        Right cervical: No superficial cervical adenopathy present.       Left cervical: No superficial cervical adenopathy present.   Neurological: She is alert and oriented to person, place, and time.   Skin: Skin is warm, dry and intact.   Psychiatric: Her speech is normal and behavior is normal. Thought content normal. Her mood appears anxious.   Vitals reviewed.       Assessment:     Problem List Items Addressed This Visit        Immunology/Multi System    Sarcoidosis     Followed and  managed by pulmonology            Oncology    Anemia, chronic disease - Primary     On our last visit we decided we would try oral iron replacement therapy to see if it would improve her anemia.  The patient tells me that she took ICAR-C for approximately 1 week and developed constipation and stop taking.    Will try ICAR-C.  I have increased her Colace to 100 mg p.o. b.i.d. I have also added MiraLax 17 g p.o. daily to help prevent constipation while on oral iron replacement therapy.  I have also added vitamin-D level per patient's request.    F/U in 2 months with labs 1-2 days prior.  We will call patient with vitamin-D results and initiate vitamin D supplementation if found to be deficient.         Relevant Medications    polyethylene glycol (GLYCOLAX) 17 gram/dose powder    docusate sodium (COLACE) 100 MG capsule    Other Relevant Orders    CBC auto differential    Haptoglobin    Bilirubin, total    Lactate dehydrogenase    Pathologist Interpretation Differential    Ferritin    Iron and TIBC    C-reactive protein    Vitamin D    Other vitamin B12 deficiency anemias     Last vitamin B12 09/2018 within the lower limits of normal            Endocrine    Vitamin D deficiency    Relevant Orders    Vitamin D            Plan:     Anemia, chronic disease  -     CBC auto differential; Future; Expected date: 11/26/2018  -     Haptoglobin; Future; Expected date: 11/26/2018  -     Bilirubin, total; Future; Expected date: 11/26/2018  -     Lactate dehydrogenase; Future; Expected date: 11/26/2018  -     Pathologist Interpretation Differential; Future; Expected date: 11/26/2018  -     Ferritin; Future; Expected date: 11/26/2018  -     Iron and TIBC; Future; Expected date: 11/26/2018  -     C-reactive protein; Future; Expected date: 11/26/2018  -     polyethylene glycol (GLYCOLAX) 17 gram/dose powder; Take 17 g by mouth once daily. for 365 doses  Dispense: 510 g; Refill: 11  -     docusate sodium (COLACE) 100 MG capsule; Take 1  capsule (100 mg total) by mouth 2 (two) times daily.  Dispense: 60 capsule; Refill: 11  -     Vitamin D; Future; Expected date: 11/26/2018    Vitamin D deficiency   -     Vitamin D; Future; Expected date: 11/26/2018    Other vitamin B12 deficiency anemias    Sarcoidosis          I will review assessment/plan with collaborating physician     NORMAN Fuentes

## 2018-11-26 NOTE — PATIENT INSTRUCTIONS
Iron Supplements  Most people think of iron as a metal that is used to make pots, frying pans, and soup kettles. This same metal (or mineral) also plays a vital role in the body. Iron helps the blood cells carry oxygen. When you dont get enough iron, you may feel tired and lack energy. Over time, without enough iron, your body makes fewer red blood cells. This can cause a condition called iron-deficiency anemia. Since your body doesnt make iron, you must get it from foods or supplements.     Food sources of iron  Iron is found in a few types of foods. Good sources include:  · Red meat, poultry, fish, eggs  · Dried fruits (especially raisins, prunes, figs)  · Legumes such as dried beans and lentils  · Breads and cereals with iron added  · Blackstrap molasses  · Spinach  · Foods cooked in cast-iron pans. This is especially true of acidic foods, such as tomatoes and rafael.   Why use a supplement?  Women often need additional iron because they lose menstrual blood during their period. But even grown men and boys may need a supplement at some point. You may need an iron supplement if any of the following is true for you:  · I rarely eat foods that are high in iron (such as red meat, poultry, dried beans, and foods with iron added).  · I am a vegetarian and I rarely eat legumes (dried beans, peas, lentils).  · I am a woman who has heavy menstrual periods.  · I am pregnant or breastfeeding.  · I have been diagnosed with iron-deficiency anemia.  If you take iron  Here are some tips to help you get the most from an iron supplement:  · Take it with vitamin C for better absorption.  · Dont take an iron supplement with milk. The calcium in milk limits iron absorption.  · Iron supplements can cause constipation. To prevent this, drink plenty of water, eat high-fiber foods, and exercise often. Also try an iron supplement with an added stool softener.  · Eat a healthy diet to get all the nutrients your body  needs.  Recommended iron intake  The amount of iron each person needs is different, and varies by age. Women who are pregnant or breastfeeding need extra iron. But taking more than the suggested amount is not always healthy. Your health care provider can help you choose the right amount of iron for you.   Date Last Reviewed: 6/2/2015  © 8108-9709 [employer]. [address] [place] [address], [place] [address]. All rights reserved. This information is not intended as a substitute for professional medical care. Always follow your healthcare professional's instructions.

## 2018-11-28 DIAGNOSIS — E55.9 VITAMIN D DEFICIENCY: Primary | ICD-10-CM

## 2018-11-28 RX ORDER — VIT C/E/ZN/COPPR/LUTEIN/ZEAXAN 250MG-90MG
1000 CAPSULE ORAL DAILY
Refills: 0 | COMMUNITY
Start: 2018-11-28 | End: 2019-02-04 | Stop reason: ALTCHOICE

## 2019-01-30 ENCOUNTER — LAB VISIT (OUTPATIENT)
Dept: LAB | Facility: HOSPITAL | Age: 64
End: 2019-01-30
Attending: NURSE PRACTITIONER
Payer: MEDICARE

## 2019-01-30 DIAGNOSIS — D63.8 ANEMIA, CHRONIC DISEASE: ICD-10-CM

## 2019-01-30 DIAGNOSIS — E55.9 VITAMIN D DEFICIENCY: ICD-10-CM

## 2019-01-30 LAB
25(OH)D3+25(OH)D2 SERPL-MCNC: 11 NG/ML
BASOPHILS # BLD AUTO: 0.01 K/UL
BASOPHILS NFR BLD: 0.1 %
BILIRUB SERPL-MCNC: 0.3 MG/DL
CRP SERPL-MCNC: 18.3 MG/L
DIFFERENTIAL METHOD: ABNORMAL
EOSINOPHIL # BLD AUTO: 0.1 K/UL
EOSINOPHIL NFR BLD: 1.4 %
ERYTHROCYTE [DISTWIDTH] IN BLOOD BY AUTOMATED COUNT: 15.3 %
FERRITIN SERPL-MCNC: 104 NG/ML
HAPTOGLOB SERPL-MCNC: 311 MG/DL
HCT VFR BLD AUTO: 36.8 %
HGB BLD-MCNC: 11.8 G/DL
IRON SERPL-MCNC: 42 UG/DL
LDH SERPL L TO P-CCNC: 170 U/L
LYMPHOCYTES # BLD AUTO: 3 K/UL
LYMPHOCYTES NFR BLD: 34.5 %
MCH RBC QN AUTO: 27.6 PG
MCHC RBC AUTO-ENTMCNC: 32.1 G/DL
MCV RBC AUTO: 86 FL
MONOCYTES # BLD AUTO: 0.5 K/UL
MONOCYTES NFR BLD: 6.1 %
NEUTROPHILS # BLD AUTO: 5 K/UL
NEUTROPHILS NFR BLD: 57.9 %
PATH REV BLD -IMP: NORMAL
PATH REV BLD -IMP: NORMAL
PLATELET # BLD AUTO: 346 K/UL
PMV BLD AUTO: 10.2 FL
RBC # BLD AUTO: 4.27 M/UL
SATURATED IRON: 11 %
TOTAL IRON BINDING CAPACITY: 385 UG/DL
TRANSFERRIN SERPL-MCNC: 260 MG/DL
WBC # BLD AUTO: 8.55 K/UL

## 2019-01-30 PROCEDURE — 36415 COLL VENOUS BLD VENIPUNCTURE: CPT | Mod: PO

## 2019-01-30 PROCEDURE — 82728 ASSAY OF FERRITIN: CPT

## 2019-01-30 PROCEDURE — 85060 PATHOLOGIST REVIEW: ICD-10-PCS | Mod: ,,, | Performed by: PATHOLOGY

## 2019-01-30 PROCEDURE — 83010 ASSAY OF HAPTOGLOBIN QUANT: CPT

## 2019-01-30 PROCEDURE — 85025 COMPLETE CBC W/AUTO DIFF WBC: CPT

## 2019-01-30 PROCEDURE — 82306 VITAMIN D 25 HYDROXY: CPT

## 2019-01-30 PROCEDURE — 83540 ASSAY OF IRON: CPT

## 2019-01-30 PROCEDURE — 82247 BILIRUBIN TOTAL: CPT

## 2019-01-30 PROCEDURE — 85060 BLOOD SMEAR INTERPRETATION: CPT | Mod: ,,, | Performed by: PATHOLOGY

## 2019-01-30 PROCEDURE — 83615 LACTATE (LD) (LDH) ENZYME: CPT

## 2019-01-30 PROCEDURE — 86140 C-REACTIVE PROTEIN: CPT

## 2019-02-04 ENCOUNTER — OFFICE VISIT (OUTPATIENT)
Dept: HEMATOLOGY/ONCOLOGY | Facility: CLINIC | Age: 64
End: 2019-02-04
Payer: MEDICARE

## 2019-02-04 VITALS
SYSTOLIC BLOOD PRESSURE: 123 MMHG | OXYGEN SATURATION: 98 % | TEMPERATURE: 97 F | BODY MASS INDEX: 37.44 KG/M2 | WEIGHT: 238.56 LBS | HEIGHT: 67 IN | DIASTOLIC BLOOD PRESSURE: 77 MMHG | HEART RATE: 75 BPM

## 2019-02-04 DIAGNOSIS — E55.9 VITAMIN D DEFICIENCY: Primary | ICD-10-CM

## 2019-02-04 DIAGNOSIS — D50.9 IRON DEFICIENCY ANEMIA, UNSPECIFIED IRON DEFICIENCY ANEMIA TYPE: ICD-10-CM

## 2019-02-04 DIAGNOSIS — D86.9 SARCOIDOSIS: ICD-10-CM

## 2019-02-04 DIAGNOSIS — D51.8 OTHER VITAMIN B12 DEFICIENCY ANEMIAS: ICD-10-CM

## 2019-02-04 PROCEDURE — 99999 PR PBB SHADOW E&M-EST. PATIENT-LVL III: ICD-10-PCS | Mod: PBBFAC,,, | Performed by: NURSE PRACTITIONER

## 2019-02-04 PROCEDURE — 99999 PR PBB SHADOW E&M-EST. PATIENT-LVL III: CPT | Mod: PBBFAC,,, | Performed by: NURSE PRACTITIONER

## 2019-02-04 PROCEDURE — 99213 OFFICE O/P EST LOW 20 MIN: CPT | Mod: PBBFAC,PN | Performed by: NURSE PRACTITIONER

## 2019-02-04 PROCEDURE — 99214 PR OFFICE/OUTPT VISIT, EST, LEVL IV, 30-39 MIN: ICD-10-PCS | Mod: S$PBB,,, | Performed by: NURSE PRACTITIONER

## 2019-02-04 PROCEDURE — 99214 OFFICE O/P EST MOD 30 MIN: CPT | Mod: S$PBB,,, | Performed by: NURSE PRACTITIONER

## 2019-02-04 RX ORDER — ASPIRIN 325 MG
50000 TABLET, DELAYED RELEASE (ENTERIC COATED) ORAL
Qty: 4 CAPSULE | Refills: 1 | Status: SHIPPED | OUTPATIENT
Start: 2019-02-04 | End: 2019-03-26

## 2019-02-04 RX ORDER — HEPARIN 100 UNIT/ML
500 SYRINGE INTRAVENOUS
Status: CANCELLED | OUTPATIENT
Start: 2019-02-04

## 2019-02-04 RX ORDER — SODIUM CHLORIDE 0.9 % (FLUSH) 0.9 %
10 SYRINGE (ML) INJECTION
Status: CANCELLED | OUTPATIENT
Start: 2019-02-04

## 2019-02-04 RX ORDER — HEPARIN 100 UNIT/ML
500 SYRINGE INTRAVENOUS
Status: CANCELLED | OUTPATIENT
Start: 2019-02-11

## 2019-02-04 RX ORDER — SODIUM CHLORIDE 0.9 % (FLUSH) 0.9 %
10 SYRINGE (ML) INJECTION
Status: CANCELLED | OUTPATIENT
Start: 2019-02-11

## 2019-02-04 NOTE — ASSESSMENT & PLAN NOTE
Patient admits to not taking Vitamin D tablets as prescribed. She reports that weekly dosing may be easier for her to remember. Will try Vitamin D3 50,000 units weekly x 8 weeks. Thereafter she knows we may need to continue Vitamin D 1,000 units daily to maintain Vitamin D levels. Discussed with patient importance of normal Vitamin D levels for bone health to help prevent Osteopetrosis and related fractures    Will repeat Vitamin D level in 8 weeks prior to next f/u appointment

## 2019-02-04 NOTE — ASSESSMENT & PLAN NOTE
Patient admits to not taking ICAR-C due to constipation. She did not take Colace as prescribed. Also did not try Miralax to help relieve constipation.    I discussed with patient IV iron infusion and the lack of GI related side effects including constipation, nausea/vomiting. She declines IV Iron at this time and reports she will try and do better with regimen to help prevent constipation including stool softener/laxative as need. Increase fluid intake and increasing activity levels    Will give patient another trial of PO iron replacement therapy. Hemoglobin near normal so I am okay with holding off on IV iron for now.    F/U 8 weeks with labs prior. She knows to call sooner if questions or concerns or if continued constipation despite measures aforementioned.

## 2019-02-04 NOTE — ASSESSMENT & PLAN NOTE
Most recent Vitamin B12 level low normal. Will repeat Vitamin B12 level with next visit's lab work. May need to initiate Vitamin B12 supplementation if deficient     Patient reports history of colonoscopy in 2017 but does not remember where. I have placed orders for EGD/Colonoscopy for evaluation of possible etiology of iron deficiency.

## 2019-02-04 NOTE — PROGRESS NOTES
Subjective:       Patient ID: Rose Leal is a 64 y.o. female.    Chief Complaint: Lab results, anemia    HPI: 63 y.o female with fibromyalgia, sarcoidosis (diagnosed by bronchoscopy since 1992, managed by Pulmonology) who comes to the clinic today for lab results and for anemia follow up. The patient has previously been seen in the hem/onc clinic by Dr. Trejo.  I have reviewed all relevant clinical data available in the medical record and have utilized this in my evaluation and treatment recommendations today. The patient says she had a colonoscopy in 2017, outside the clinic and was told that it was okay.  This colonoscopy was done because of abdominal pain. The records have not been obtained. Hematological workup thus far have been negative excluding bone marrow biopsy which has not been performed. The patient does have iron deficiency and vitamin D deficiency.     Today's Visit:  We have attempted oral iron replacement therapy, but she admits to being noncompliant due to reported constipation. She has not been compliant with measures to help prevent/relieve constipation. She declines IV iron replacement. We reviewed constipation prevention measures and tips to remember taking medication. Patient also admits to not taking Vitamin D tablets as prescribed. She states she thought this worsened her constipation. Today she is without complaints.      Social History     Socioeconomic History    Marital status:      Spouse name: Not on file    Number of children: Not on file    Years of education: Not on file    Highest education level: Not on file   Social Needs    Financial resource strain: Not on file    Food insecurity - worry: Not on file    Food insecurity - inability: Not on file    Transportation needs - medical: Not on file    Transportation needs - non-medical: Not on file   Occupational History    Not on file   Tobacco Use    Smoking status: Never Smoker    Smokeless tobacco: Never  Used   Substance and Sexual Activity    Alcohol use: Yes     Comment: occasional    Drug use: No    Sexual activity: Not on file   Other Topics Concern    Not on file   Social History Narrative    Not on file       Past Medical History:   Diagnosis Date    Arthritis     Blunt trauma, left eye     Carpal tunnel syndrome     Diabetes mellitus     Hypertension     Neuromuscular disorder     LYLE (obstructive sleep apnea) Severe    Sarcoidosis of lung        Family History   Problem Relation Age of Onset    Cataracts Mother     Cataracts Maternal Grandmother     Glaucoma Maternal Grandmother        Past Surgical History:   Procedure Laterality Date    BREAST SURGERY      HYSTERECTOMY      NECK SURGERY         Review of Systems   Constitutional: Negative for activity change, appetite change, chills, fatigue, fever and unexpected weight change.   HENT: Negative for congestion, mouth sores, nosebleeds, rhinorrhea, sore throat, trouble swallowing and voice change.    Eyes: Negative for visual disturbance.   Respiratory: Negative for cough, chest tightness and shortness of breath.    Cardiovascular: Negative for chest pain and leg swelling.   Gastrointestinal: Positive for constipation (occasional). Negative for abdominal distention, abdominal pain, blood in stool, diarrhea, nausea and vomiting.   Genitourinary: Negative for difficulty urinating, dysuria and hematuria.   Musculoskeletal: Positive for arthralgias and myalgias.   Skin: Negative for rash.   Neurological: Negative for dizziness, weakness and headaches.   Hematological: Negative for adenopathy. Does not bruise/bleed easily.   Psychiatric/Behavioral: The patient is nervous/anxious.          Medication List with Changes/Refills   New Medications    CHOLECALCIFEROL, VITAMIN D3, 50,000 UNIT CAPSULE    Take 1 capsule (50,000 Units total) by mouth every 7 days. for 8 doses   Current Medications    ALBUTEROL 90 MCG/ACTUATION INHALER    Inhale 2 puffs  into the lungs every 4 (four) hours as needed for Wheezing.    AMITRIPTYLINE (ELAVIL) 25 MG TABLET    Take 1 tablet (25 mg total) by mouth every evening.    AMLODIPINE-BENAZEPRIL (LOTREL) 10-40 MG PER CAPSULE    Take 1 capsule by mouth once daily.    CARVEDILOL (COREG) 6.25 MG TABLET    Take 6.25 mg by mouth 2 (two) times daily with meals.    CYCLOBENZAPRINE (FLEXERIL) 10 MG TABLET    10 mg 3 (three) times daily as needed.     DOCUSATE SODIUM (COLACE) 100 MG CAPSULE    Take 1 capsule (100 mg total) by mouth 2 (two) times daily.    GABAPENTIN (NEURONTIN) 300 MG CAPSULE    Take 1 capsule (300 mg total) by mouth 3 (three) times daily.    GLIMEPIRIDE (AMARYL) 1 MG TABLET    2 mg once daily.    HYDROCODONE-ACETAMINOPHEN (NORCO)  MG PER TABLET    Take 1 tablet by mouth every 12 (twelve) hours as needed for Pain.    IBUPROFEN (ADVIL,MOTRIN) 800 MG TABLET    Take 800 mg by mouth 3 (three) times daily.    IRON-VITAMIN C 100-250 MG, ICAR-C, (ICAR-C) 100-250 MG TAB    Take 1 tablet by mouth once daily.    NEXIUM 40 MG CAPSULE    40 mg once daily.    POLYETHYLENE GLYCOL (GLYCOLAX) 17 GRAM/DOSE POWDER    Take 17 g by mouth once daily. for 365 doses    PRAVASTATIN (PRAVACHOL) 40 MG TABLET    Take 40 mg by mouth once daily.   Discontinued Medications    CHOLECALCIFEROL, VITAMIN D3, (VITAMIN D3) 1,000 UNIT CAPSULE    Take 1 capsule (1,000 Units total) by mouth once daily.     Objective:     Vitals:    02/04/19 0928   BP: 123/77   Pulse: 75   Temp: 97.3 °F (36.3 °C)       Physical Exam   Constitutional: She is oriented to person, place, and time. Vital signs are normal. She appears well-developed and well-nourished. She is cooperative.   HENT:   Head: Normocephalic.   Right Ear: Hearing normal.   Left Ear: Hearing normal.   Nose: Nose normal.   Mouth/Throat: Oropharynx is clear and moist.   Eyes: Conjunctivae, EOM and lids are normal. Right eye exhibits no discharge. Left eye exhibits no discharge.   Neck: Normal range of  motion. No thyroid mass present.   Cardiovascular: Normal rate, regular rhythm and normal heart sounds.   No murmur heard.  Pulmonary/Chest: Effort normal and breath sounds normal. No respiratory distress. She has no wheezes. She has no rhonchi. She has no rales.   Abdominal: Soft. Bowel sounds are normal. She exhibits no distension. There is no hepatosplenomegaly. There is no tenderness.   Musculoskeletal: Normal range of motion. She exhibits no edema.   Lymphadenopathy:        Head (right side): No preauricular and no posterior auricular adenopathy present.        Head (left side): No preauricular and no posterior auricular adenopathy present.        Right cervical: No superficial cervical adenopathy present.       Left cervical: No superficial cervical adenopathy present.   Neurological: She is alert and oriented to person, place, and time.   Skin: Skin is warm, dry and intact.   Psychiatric: Her speech is normal and behavior is normal. Thought content normal. Her mood appears anxious.   Vitals reviewed.       Assessment:     Problem List Items Addressed This Visit        Immunology/Multi System    Sarcoidosis     Followed and managed by Pulmonology             Oncology    Other vitamin B12 deficiency anemias     Most recent Vitamin B12 level low normal. Will repeat Vitamin B12 level with next visit's lab work. May need to initiate Vitamin B12 supplementation if deficient     Patient reports history of colonoscopy in 2017 but does not remember where. I have placed orders for EGD/Colonoscopy for evaluation of possible etiology of iron deficiency.          Relevant Orders    Vitamin B12    Methylmalonic acid, serum    Iron deficiency anemia     Patient admits to not taking ICAR-C due to constipation. She did not take Colace as prescribed. Also did not try Miralax to help relieve constipation.    I discussed with patient IV iron infusion and the lack of GI related side effects including constipation,  nausea/vomiting. She declines IV Iron at this time and reports she will try and do better with regimen to help prevent constipation including stool softener/laxative as need. Increase fluid intake and increasing activity levels    Will give patient another trial of PO iron replacement therapy. Hemoglobin near normal so I am okay with holding off on IV iron for now.    F/U 8 weeks with labs prior. She knows to call sooner if questions or concerns or if continued constipation despite measures aforementioned.          Relevant Medications    cholecalciferol, vitamin D3, 50,000 unit capsule    Other Relevant Orders    Case request GI: COLONOSCOPY/EGD (Completed)    CBC auto differential    Iron and TIBC    Ferritin    Comprehensive metabolic panel       Endocrine    Vitamin D deficiency - Primary     Patient admits to not taking Vitamin D tablets as prescribed. She reports that weekly dosing may be easier for her to remember. Will try Vitamin D3 50,000 units weekly x 8 weeks. Thereafter she knows we may need to continue Vitamin D 1,000 units daily to maintain Vitamin D levels. Discussed with patient importance of normal Vitamin D levels for bone health to help prevent Osteopetrosis and related fractures    Will repeat Vitamin D level in 8 weeks prior to next f/u appointment         Relevant Orders    Vitamin D            Plan:     Vitamin D deficiency  -     Vitamin D; Future; Expected date: 02/04/2019    Iron deficiency anemia, unspecified iron deficiency anemia type  -     cholecalciferol, vitamin D3, 50,000 unit capsule; Take 1 capsule (50,000 Units total) by mouth every 7 days. for 8 doses  Dispense: 4 capsule; Refill: 1  -     Case request GI: COLONOSCOPY/EGD  -     CBC auto differential; Future; Expected date: 02/04/2019  -     Iron and TIBC; Future; Expected date: 02/04/2019  -     Ferritin; Future; Expected date: 02/04/2019  -     Comprehensive metabolic panel; Future; Expected date: 02/04/2019    Other vitamin  B12 deficiency anemias  -     Vitamin B12; Future; Expected date: 02/04/2019  -     Methylmalonic acid, serum; Future; Expected date: 02/04/2019    Sarcoidosis    Other orders  -     sodium chloride 0.9% 100 mL flush bag  -     sodium chloride 0.9% flush 10 mL  -     heparin, porcine (PF) 100 unit/mL injection flush 500 Units  -     alteplase injection 2 mg  -     ferric carboxymaltose (INJECTAFER) 750 mg in sodium chloride 0.9% 250 mL infusion  -     sodium chloride 0.9% 100 mL flush bag  -     sodium chloride 0.9% flush 10 mL  -     heparin, porcine (PF) 100 unit/mL injection flush 500 Units  -     alteplase injection 2 mg  -     ferric carboxymaltose (INJECTAFER) 750 mg in sodium chloride 0.9% 250 mL infusion          I will review assessment/plan with collaborating physician     NORMAN Fuentes

## 2019-02-04 NOTE — PATIENT INSTRUCTIONS
Please take Vitamin D tablet once every 7 days. Please take ICAR-C iron pill daily. If you experience nausea/vomiting try taking the iron pill at night before bedtime. Dairy inhibits iron absorption. Do not eat dairy products within 2 hours of taking iron pill. Vitamin C enhances iron absorption. Take your colace stool softener twice a day. Also take Miralax powder as needed for constipation. GI will call you to make appointment for EGD/Colonoscopy to evaluate possible etiology of iron loss.

## 2019-02-05 ENCOUNTER — TELEPHONE (OUTPATIENT)
Dept: HEMATOLOGY/ONCOLOGY | Facility: CLINIC | Age: 64
End: 2019-02-05

## 2019-02-05 NOTE — TELEPHONE ENCOUNTER
Spoke with pt and she said that she wants Dr. Manny Crow to do her colonoscopy. I told her I will let Ty know that she is gone for the day. That I think she may need to put in orders and us fax them to .  but we will figure it out. Pt verbalized understanding. Pt gave info to office in previous notes.

## 2019-02-05 NOTE — TELEPHONE ENCOUNTER
----- Message from Ani Marrufo sent at 2/5/2019  4:36 PM CST -----  Contact: self/[phone]  Would like to consult with nurse regarding her colonoscopy. Patient states she would like for Dr Manny Crow to do it. His number at office is [phone]. Please call patient back at [phone]. Thanks/ar

## 2019-02-06 ENCOUNTER — TELEPHONE (OUTPATIENT)
Dept: HEMATOLOGY/ONCOLOGY | Facility: CLINIC | Age: 64
End: 2019-02-06

## 2019-02-07 ENCOUNTER — TELEPHONE (OUTPATIENT)
Dept: ENDOSCOPY | Facility: HOSPITAL | Age: 64
End: 2019-02-07

## 2019-02-07 NOTE — TELEPHONE ENCOUNTER
Pt is requesting to see Dr.Michael Crow who does not scope at [facility].  I directed pt to call Hematology clinic.

## 2019-02-08 ENCOUNTER — TELEPHONE (OUTPATIENT)
Dept: ENDOSCOPY | Facility: HOSPITAL | Age: 64
End: 2019-02-08

## 2019-02-08 NOTE — TELEPHONE ENCOUNTER
2/7/19 @1149 Per [employer], Person pressed touch tone key to speak with an endoscopy . Per Meg DOMÍNGUEZ's note, pt needs to talk with Dr. Crow's office first.

## 2019-02-11 ENCOUNTER — PATIENT MESSAGE (OUTPATIENT)
Dept: HEMATOLOGY/ONCOLOGY | Facility: CLINIC | Age: 64
End: 2019-02-11

## 2019-02-11 ENCOUNTER — TELEPHONE (OUTPATIENT)
Dept: ENDOSCOPY | Facility: HOSPITAL | Age: 64
End: 2019-02-11

## 2019-02-11 ENCOUNTER — TELEPHONE (OUTPATIENT)
Dept: GASTROENTEROLOGY | Facility: CLINIC | Age: 64
End: 2019-02-11

## 2019-02-11 NOTE — TELEPHONE ENCOUNTER
----- Message from Genoveva Pérez LPN sent at 2/11/2019  4:04 PM CST -----      ----- Message -----  From: Megan Joel  Sent: 2/11/2019  11:08 AM  To: [facility] Staff    Patient states doctor Tristin needs results fax to [phone] for colonoscopy if you have any questions you can call..[phone]

## 2019-02-12 ENCOUNTER — TELEPHONE (OUTPATIENT)
Dept: HEMATOLOGY/ONCOLOGY | Facility: CLINIC | Age: 64
End: 2019-02-12

## 2019-02-12 NOTE — TELEPHONE ENCOUNTER
----- Message from Ekaterina Buenrostro sent at 2/12/2019  2:50 PM CST -----  Contact: Delmy- Dr. Crow  Pt states she's anemic and Delmy is calling in regards to pt's recent lab work. Please call Delmy back at [phone] or fax labs to [phone]. Pt is being seen by Dr. Crow first thing in the morning.       Thanks,   Ekaterina Buenrostro

## 2019-03-29 ENCOUNTER — LAB VISIT (OUTPATIENT)
Dept: LAB | Facility: HOSPITAL | Age: 64
End: 2019-03-29
Attending: NURSE PRACTITIONER
Payer: MEDICARE

## 2019-03-29 DIAGNOSIS — D50.9 IRON DEFICIENCY ANEMIA, UNSPECIFIED IRON DEFICIENCY ANEMIA TYPE: ICD-10-CM

## 2019-03-29 DIAGNOSIS — E55.9 VITAMIN D DEFICIENCY: ICD-10-CM

## 2019-03-29 DIAGNOSIS — D51.8 OTHER VITAMIN B12 DEFICIENCY ANEMIAS: ICD-10-CM

## 2019-03-29 LAB
25(OH)D3+25(OH)D2 SERPL-MCNC: 10 NG/ML (ref 30–96)
ALBUMIN SERPL BCP-MCNC: 3.6 G/DL (ref 3.5–5.2)
ALP SERPL-CCNC: 105 U/L (ref 55–135)
ALT SERPL W/O P-5'-P-CCNC: 9 U/L (ref 10–44)
ANION GAP SERPL CALC-SCNC: 9 MMOL/L (ref 8–16)
AST SERPL-CCNC: 10 U/L (ref 10–40)
BASOPHILS # BLD AUTO: 0.03 K/UL (ref 0–0.2)
BASOPHILS NFR BLD: 0.3 % (ref 0–1.9)
BILIRUB SERPL-MCNC: 0.3 MG/DL (ref 0.1–1)
BUN SERPL-MCNC: 18 MG/DL (ref 8–23)
CALCIUM SERPL-MCNC: 9.5 MG/DL (ref 8.7–10.5)
CHLORIDE SERPL-SCNC: 108 MMOL/L (ref 95–110)
CO2 SERPL-SCNC: 27 MMOL/L (ref 23–29)
CREAT SERPL-MCNC: 0.9 MG/DL (ref 0.5–1.4)
DIFFERENTIAL METHOD: ABNORMAL
EOSINOPHIL # BLD AUTO: 0.2 K/UL (ref 0–0.5)
EOSINOPHIL NFR BLD: 1.9 % (ref 0–8)
ERYTHROCYTE [DISTWIDTH] IN BLOOD BY AUTOMATED COUNT: 14.7 % (ref 11.5–14.5)
EST. GFR  (AFRICAN AMERICAN): >60 ML/MIN/1.73 M^2
EST. GFR  (NON AFRICAN AMERICAN): >60 ML/MIN/1.73 M^2
FERRITIN SERPL-MCNC: 99 NG/ML (ref 20–300)
GLUCOSE SERPL-MCNC: 118 MG/DL (ref 70–110)
HCT VFR BLD AUTO: 35.5 % (ref 37–48.5)
HGB BLD-MCNC: 11 G/DL (ref 12–16)
IRON SERPL-MCNC: 48 UG/DL (ref 30–160)
LYMPHOCYTES # BLD AUTO: 2.6 K/UL (ref 1–4.8)
LYMPHOCYTES NFR BLD: 27.5 % (ref 18–48)
MCH RBC QN AUTO: 27.3 PG (ref 27–31)
MCHC RBC AUTO-ENTMCNC: 31 G/DL (ref 32–36)
MCV RBC AUTO: 88 FL (ref 82–98)
MONOCYTES # BLD AUTO: 0.7 K/UL (ref 0.3–1)
MONOCYTES NFR BLD: 7.1 % (ref 4–15)
NEUTROPHILS # BLD AUTO: 6 K/UL (ref 1.8–7.7)
NEUTROPHILS NFR BLD: 62.9 % (ref 38–73)
NRBC BLD-RTO: 0 /100 WBC
PLATELET # BLD AUTO: 336 K/UL (ref 150–350)
PMV BLD AUTO: 10.8 FL (ref 9.2–12.9)
POTASSIUM SERPL-SCNC: 4.2 MMOL/L (ref 3.5–5.1)
PROT SERPL-MCNC: 7.4 G/DL (ref 6–8.4)
RBC # BLD AUTO: 4.03 M/UL (ref 4–5.4)
SATURATED IRON: 12 % (ref 20–50)
SODIUM SERPL-SCNC: 144 MMOL/L (ref 136–145)
TOTAL IRON BINDING CAPACITY: 391 UG/DL (ref 250–450)
TRANSFERRIN SERPL-MCNC: 264 MG/DL (ref 200–375)
VIT B12 SERPL-MCNC: 292 PG/ML (ref 210–950)
WBC # BLD AUTO: 9.6 K/UL (ref 3.9–12.7)

## 2019-03-29 PROCEDURE — 80053 COMPREHEN METABOLIC PANEL: CPT

## 2019-03-29 PROCEDURE — 36415 COLL VENOUS BLD VENIPUNCTURE: CPT | Mod: PO

## 2019-03-29 PROCEDURE — 83540 ASSAY OF IRON: CPT

## 2019-03-29 PROCEDURE — 82728 ASSAY OF FERRITIN: CPT

## 2019-03-29 PROCEDURE — 82607 VITAMIN B-12: CPT

## 2019-03-29 PROCEDURE — 85025 COMPLETE CBC W/AUTO DIFF WBC: CPT

## 2019-03-29 PROCEDURE — 82306 VITAMIN D 25 HYDROXY: CPT

## 2019-03-29 PROCEDURE — 83921 ORGANIC ACID SINGLE QUANT: CPT

## 2019-04-01 ENCOUNTER — TELEPHONE (OUTPATIENT)
Dept: GASTROENTEROLOGY | Facility: CLINIC | Age: 64
End: 2019-04-01

## 2019-04-02 LAB — METHYLMALONATE SERPL-SCNC: 0.14 UMOL/L

## 2019-09-23 ENCOUNTER — OFFICE VISIT (OUTPATIENT)
Dept: PULMONOLOGY | Facility: CLINIC | Age: 64
End: 2019-09-23
Payer: MEDICARE

## 2019-09-23 ENCOUNTER — HOSPITAL ENCOUNTER (OUTPATIENT)
Dept: RADIOLOGY | Facility: HOSPITAL | Age: 64
Discharge: HOME | End: 2019-09-23
Attending: NURSE PRACTITIONER
Payer: MEDICARE

## 2019-09-23 ENCOUNTER — CLINICAL SUPPORT (OUTPATIENT)
Dept: PULMONOLOGY | Facility: CLINIC | Age: 64
End: 2019-09-23
Payer: MEDICARE

## 2019-09-23 VITALS
SYSTOLIC BLOOD PRESSURE: 130 MMHG | HEART RATE: 83 BPM | HEIGHT: 67 IN | RESPIRATION RATE: 18 BRPM | OXYGEN SATURATION: 98 % | BODY MASS INDEX: 38.27 KG/M2 | DIASTOLIC BLOOD PRESSURE: 78 MMHG | WEIGHT: 243.81 LBS

## 2019-09-23 DIAGNOSIS — G47.33 OSA (OBSTRUCTIVE SLEEP APNEA): ICD-10-CM

## 2019-09-23 DIAGNOSIS — D86.9 SARCOIDOSIS: ICD-10-CM

## 2019-09-23 DIAGNOSIS — E66.9 OBESITY, CLASS II, BMI 35-39.9: ICD-10-CM

## 2019-09-23 PROBLEM — E66.812 OBESITY, CLASS II, BMI 35-39.9: Status: ACTIVE | Noted: 2019-09-23

## 2019-09-23 PROCEDURE — 94726 PLETHYSMOGRAPHY LUNG VOLUMES: CPT | Mod: PBBFAC

## 2019-09-23 PROCEDURE — 94729 PR C02/MEMBANE DIFFUSE CAPACITY: ICD-10-PCS | Mod: 26,S$PBB,, | Performed by: INTERNAL MEDICINE

## 2019-09-23 PROCEDURE — 71046 X-RAY EXAM CHEST 2 VIEWS: CPT | Mod: TC

## 2019-09-23 PROCEDURE — 71046 XR CHEST PA AND LATERAL: ICD-10-PCS | Mod: 26,,, | Performed by: RADIOLOGY

## 2019-09-23 PROCEDURE — 94729 DIFFUSING CAPACITY: CPT | Mod: 26,S$PBB,, | Performed by: INTERNAL MEDICINE

## 2019-09-23 PROCEDURE — 94726 PULM FUNCT TST PLETHYSMOGRAP: ICD-10-PCS | Mod: 26,S$PBB,, | Performed by: INTERNAL MEDICINE

## 2019-09-23 PROCEDURE — 99214 OFFICE O/P EST MOD 30 MIN: CPT | Mod: PBBFAC,25 | Performed by: NURSE PRACTITIONER

## 2019-09-23 PROCEDURE — 99999 PR PBB SHADOW E&M-EST. PATIENT-LVL IV: ICD-10-PCS | Mod: PBBFAC,,, | Performed by: NURSE PRACTITIONER

## 2019-09-23 PROCEDURE — 99999 PR PBB SHADOW E&M-EST. PATIENT-LVL IV: CPT | Mod: PBBFAC,,, | Performed by: NURSE PRACTITIONER

## 2019-09-23 PROCEDURE — 99214 PR OFFICE/OUTPT VISIT, EST, LEVL IV, 30-39 MIN: ICD-10-PCS | Mod: 25,S$PBB,, | Performed by: NURSE PRACTITIONER

## 2019-09-23 PROCEDURE — 94729 DIFFUSING CAPACITY: CPT | Mod: PBBFAC

## 2019-09-23 PROCEDURE — 99214 OFFICE O/P EST MOD 30 MIN: CPT | Mod: 25,S$PBB,, | Performed by: NURSE PRACTITIONER

## 2019-09-23 PROCEDURE — 94060 EVALUATION OF WHEEZING: CPT | Mod: 26,S$PBB,, | Performed by: INTERNAL MEDICINE

## 2019-09-23 PROCEDURE — 71046 X-RAY EXAM CHEST 2 VIEWS: CPT | Mod: 26,,, | Performed by: RADIOLOGY

## 2019-09-23 PROCEDURE — 94060 PR EVAL OF BRONCHOSPASM: ICD-10-PCS | Mod: 26,S$PBB,, | Performed by: INTERNAL MEDICINE

## 2019-09-23 PROCEDURE — 94060 EVALUATION OF WHEEZING: CPT | Mod: PBBFAC

## 2019-09-23 PROCEDURE — 94726 PLETHYSMOGRAPHY LUNG VOLUMES: CPT | Mod: 26,S$PBB,, | Performed by: INTERNAL MEDICINE

## 2019-09-23 NOTE — PROGRESS NOTES
"Subjective:      Patient ID: Rose Leal is a 64 y.o. female.    Chief Complaint: Sarcoidosis    HPI  Patient with sleep apnea and sarcoidosis. No fever, chills, or hemoptysis. No pleuritic type chest pain. Breathing is stable as compared to 6 months ago.  Sarcoid diagnosed by bronchoscopy 1992 by Dr. Bakari LACKEY with [facility]. She did not bring in to download. Request from [employer].   Benefits from use.     Patient Active Problem List   Diagnosis    Multiple joint pain    LYLE (obstructive sleep apnea)    CRP elevated    Parotid gland enlargement    Sicca syndrome    Sarcoidosis    Vitamin D deficiency    Fibromyalgia    Inadequate sleep hygiene    Anemia, chronic disease    Other vitamin B12 deficiency anemias    Forgetfulness    At high risk for constipation    Iron deficiency anemia    Obesity, Class II, BMI 35-39.9       /78   Pulse 83   Resp 18   Ht 5' 7" (1.702 m)   Wt 110.6 kg (243 lb 13.3 oz)   SpO2 98%   BMI 38.19 kg/m²   Body mass index is 38.19 kg/m².    Review of Systems   Respiratory: Positive for somnolence.    Musculoskeletal: Positive for arthralgias.   Psychiatric/Behavioral: Positive for sleep disturbance.   All other systems reviewed and are negative.    Objective:      Physical Exam   Constitutional: She is oriented to person, place, and time. She appears well-developed and well-nourished.   Obese   HENT:   Head: Normocephalic and atraumatic.   Mouth/Throat: Oropharynx is clear and moist.   Neck: Normal range of motion. Neck supple.   Cardiovascular: Normal rate and regular rhythm. Exam reveals no gallop.   No murmur heard.  Pulmonary/Chest: Effort normal and breath sounds normal.   Abdominal: Soft. She exhibits no mass. There is no tenderness.   Musculoskeletal: Normal range of motion. She exhibits no edema.   Neurological: She is alert and oriented to person, place, and time.   Skin: Skin is warm and dry.   Psychiatric: She has a normal mood and affect.     Personal " Diagnostic Review  PFT reviewed.   CXR reviewed    Assessment:       1. LYLE (obstructive sleep apnea)    2. Sarcoidosis    3. Obesity, Class II, BMI 35-39.9        Outpatient Encounter Medications as of 9/23/2019   Medication Sig Dispense Refill    albuterol 90 mcg/actuation inhaler Inhale 2 puffs into the lungs every 4 (four) hours as needed for Wheezing. 1 Inhaler 2    amitriptyline (ELAVIL) 25 MG tablet Take 1 tablet (25 mg total) by mouth every evening. 90 tablet 3    amlodipine-benazepril (LOTREL) 10-40 mg per capsule Take 1 capsule by mouth once daily.  0    carvedilol (COREG) 6.25 MG tablet Take 6.25 mg by mouth 2 (two) times daily with meals.  0    cyclobenzaprine (FLEXERIL) 10 MG tablet 10 mg 3 (three) times daily as needed.   0    docusate sodium (COLACE) 100 MG capsule Take 1 capsule (100 mg total) by mouth 2 (two) times daily. 60 capsule 11    gabapentin (NEURONTIN) 300 MG capsule Take 1 capsule (300 mg total) by mouth 3 (three) times daily. 90 capsule 3    glimepiride (AMARYL) 1 MG tablet 2 mg once daily.  0    HYDROcodone-acetaminophen (NORCO)  mg per tablet Take 1 tablet by mouth every 12 (twelve) hours as needed for Pain.      ibuprofen (ADVIL,MOTRIN) 800 MG tablet Take 800 mg by mouth 3 (three) times daily.      iron-vitamin C 100-250 mg, ICAR-C, (ICAR-C) 100-250 mg Tab Take 1 tablet by mouth once daily. 30 tablet 11    NEXIUM 40 mg capsule 40 mg once daily.  0    polyethylene glycol (GLYCOLAX) 17 gram/dose powder Take 17 g by mouth once daily. for 365 doses 510 g 11    pravastatin (PRAVACHOL) 40 MG tablet Take 40 mg by mouth once daily.  0     No facility-administered encounter medications on file as of 9/23/2019.      Orders Placed This Encounter   Procedures    X-Ray Chest PA And Lateral     Standing Status:   Future     Standing Expiration Date:   9/23/2020     Order Specific Question:   May the Radiologist modify the order per protocol to meet the clinical needs of the  patient?     Answer:   Yes    Complete PFT without bronchodilator - Clinic     Standing Status:   Future     Standing Expiration Date:   9/23/2020     Plan:        Problem List Items Addressed This Visit        Immunology/Multi System    Sarcoidosis    Overview     Diagnosed by Dr. Villegas 1992 with bronchoscopy. Stable         Relevant Orders    X-Ray Chest PA And Lateral    Complete PFT without bronchodilator - Clinic       Endocrine    Obesity, Class II, BMI 35-39.9       Other    LYLE (obstructive sleep apnea)    Overview     AHI 71/hr. CPAP 14cm H20, [facility]         Current Assessment & Plan     Very severe LYLE. Wear pap nightly and over 4 hours. Obtain download from [employer]           Weight loss and exercise to improve overall health.

## 2019-09-23 NOTE — ASSESSMENT & PLAN NOTE
Very severe LYLE. Wear pap nightly and over 4 hours. Obtain download from [facility]

## 2019-09-24 LAB
BRPFT: ABNORMAL
DLCO ADJ PRE: 16.24 ML/(MIN*MMHG) (ref 18.24–29.71)
DLCO SINGLE BREATH LLN: 18.24
DLCO SINGLE BREATH PRE REF: 67.7 %
DLCO SINGLE BREATH REF: 23.98
DLCOC SBVA LLN: 3.09
DLCOC SBVA PRE REF: 108.6 %
DLCOC SBVA REF: 4.42
DLCOC SINGLE BREATH LLN: 18.24
DLCOC SINGLE BREATH PRE REF: 67.7 %
DLCOC SINGLE BREATH REF: 23.98
DLCOVA LLN: 3.09
DLCOVA PRE REF: 108.6 %
DLCOVA PRE: 4.79 ML/(MIN*MMHG*L) (ref 3.09–5.74)
DLCOVA REF: 4.42
DLVAADJ PRE: 4.79 ML/(MIN*MMHG*L) (ref 3.09–5.74)
ERV LLN: 0.77
ERV PRE REF: 70 %
ERV REF: 0.77
FEF 25 75 CHG: 86.1 %
FEF 25 75 LLN: 0.8
FEF 25 75 POST REF: 101.4 %
FEF 25 75 PRE REF: 54.5 %
FEF 25 75 REF: 1.95
FET100 CHG: -20.3 %
FEV1 CHG: 10.2 %
FEV1 FVC CHG: 12.5 %
FEV1 FVC LLN: 67
FEV1 FVC POST REF: 104.2 %
FEV1 FVC PRE REF: 92.6 %
FEV1 FVC REF: 79
FEV1 LLN: 1.6
FEV1 POST REF: 73.2 %
FEV1 PRE REF: 66.4 %
FEV1 REF: 2.24
FRCPLETH LLN: 2.05
FRCPLETH PREREF: 84.8 %
FRCPLETH REF: 2.87
FVC CHG: -2 %
FVC LLN: 2.07
FVC POST REF: 69.8 %
FVC PRE REF: 71.2 %
FVC REF: 2.86
IVC PRE: 2.03 L (ref 2.07–3.65)
IVC SINGLE BREATH LLN: 2.07
IVC SINGLE BREATH PRE REF: 71.1 %
IVC SINGLE BREATH REF: 2.86
MVV LLN: 86
MVV PRE REF: 48.7 %
MVV REF: 101
PEF CHG: -1.2 %
PEF LLN: 3.62
PEF POST REF: 83.3 %
PEF PRE REF: 84.3 %
PEF REF: 5.81
POST FEF 25 75: 1.98 L/S (ref 0.8–3.11)
POST FET 100: 7.57 SEC
POST FEV1 FVC: 82.13 % (ref 66.69–91.01)
POST FEV1: 1.64 L (ref 1.6–2.88)
POST FVC: 2 L (ref 2.07–3.65)
POST PEF: 4.84 L/S (ref 3.62–8)
PRE DLCO: 16.24 ML/(MIN*MMHG) (ref 18.24–29.71)
PRE ERV: 0.54 L (ref 0.77–0.77)
PRE FEF 25 75: 1.06 L/S (ref 0.8–3.11)
PRE FET 100: 9.5 SEC
PRE FEV1 FVC: 73.03 % (ref 66.69–91.01)
PRE FEV1: 1.49 L (ref 1.6–2.88)
PRE FRC PL: 2.44 L
PRE FVC: 2.04 L (ref 2.07–3.65)
PRE MVV: 49 L/MIN (ref 85.51–115.69)
PRE PEF: 4.9 L/S (ref 3.62–8)
PRE RV: 1.73 L (ref 1.53–2.68)
PRE TLC: 4 L (ref 4.44–6.42)
RAW LLN: 3.06
RAW PRE REF: 122 %
RAW PRE: 3.73 CMH2O*S/L (ref 3.06–3.06)
RAW REF: 3.06
RV LLN: 1.53
RV PRE REF: 82.4 %
RV REF: 2.1
RVTLC LLN: 31
RVTLC PRE REF: 106.4 %
RVTLC PRE: 43.32 % (ref 31.13–50.31)
RVTLC REF: 41
TLC LLN: 4.44
TLC PRE REF: 73.6 %
TLC REF: 5.43
VA PRE: 3.39 L (ref 5.28–5.28)
VA SINGLE BREATH LLN: 5.28
VA SINGLE BREATH PRE REF: 64.2 %
VA SINGLE BREATH REF: 5.28
VC LLN: 2.07
VC PRE REF: 79.1 %
VC PRE: 2.27 L (ref 2.07–3.65)
VC REF: 2.86
VTGRAWPRE: 3.01 L

## 2019-10-18 DIAGNOSIS — D63.8 ANEMIA, CHRONIC DISEASE: ICD-10-CM

## 2019-10-18 DIAGNOSIS — Z91.89 AT HIGH RISK FOR CONSTIPATION: ICD-10-CM

## 2019-10-18 RX ORDER — DOCUSATE SODIUM 100 MG/1
CAPSULE, LIQUID FILLED ORAL
Qty: 30 CAPSULE | Refills: 0 | Status: SHIPPED | OUTPATIENT
Start: 2019-10-18 | End: 2019-11-17 | Stop reason: SDUPTHER

## 2019-10-18 RX ORDER — IRON,CARBONYL/ASCORBIC ACID 100-250 MG
TABLET ORAL
Qty: 30 TABLET | Refills: 0 | Status: SHIPPED | OUTPATIENT
Start: 2019-10-18 | End: 2019-11-17 | Stop reason: SDUPTHER

## 2019-11-17 DIAGNOSIS — D63.8 ANEMIA, CHRONIC DISEASE: ICD-10-CM

## 2019-11-17 DIAGNOSIS — Z91.89 AT HIGH RISK FOR CONSTIPATION: ICD-10-CM

## 2019-11-18 RX ORDER — IRON,CARBONYL/ASCORBIC ACID 100-250 MG
TABLET ORAL
Qty: 30 TABLET | Refills: 0 | Status: SHIPPED | OUTPATIENT
Start: 2019-11-18

## 2019-11-18 RX ORDER — DOCUSATE SODIUM 100 MG/1
CAPSULE, LIQUID FILLED ORAL
Qty: 30 CAPSULE | Refills: 0 | Status: SHIPPED | OUTPATIENT
Start: 2019-11-18

## 2020-08-11 DIAGNOSIS — D86.9 SARCOIDOSIS: Primary | ICD-10-CM

## 2020-08-17 ENCOUNTER — TELEPHONE (OUTPATIENT)
Dept: PULMONOLOGY | Facility: CLINIC | Age: 65
End: 2020-08-17

## 2020-08-24 ENCOUNTER — LAB VISIT (OUTPATIENT)
Dept: OTOLARYNGOLOGY | Facility: CLINIC | Age: 65
End: 2020-08-24
Payer: MEDICARE

## 2020-08-24 DIAGNOSIS — D86.9 SARCOIDOSIS: ICD-10-CM

## 2020-08-24 PROCEDURE — U0003 INFECTIOUS AGENT DETECTION BY NUCLEIC ACID (DNA OR RNA); SEVERE ACUTE RESPIRATORY SYNDROME CORONAVIRUS 2 (SARS-COV-2) (CORONAVIRUS DISEASE [COVID-19]), AMPLIFIED PROBE TECHNIQUE, MAKING USE OF HIGH THROUGHPUT TECHNOLOGIES AS DESCRIBED BY CMS-2020-01-R: HCPCS

## 2020-08-25 LAB — SARS-COV-2 RNA RESP QL NAA+PROBE: NOT DETECTED

## 2020-08-31 ENCOUNTER — OFFICE VISIT (OUTPATIENT)
Dept: PULMONOLOGY | Facility: CLINIC | Age: 65
End: 2020-08-31
Payer: MEDICARE

## 2020-08-31 ENCOUNTER — CLINICAL SUPPORT (OUTPATIENT)
Dept: PULMONOLOGY | Facility: CLINIC | Age: 65
End: 2020-08-31
Payer: MEDICARE

## 2020-08-31 ENCOUNTER — HOSPITAL ENCOUNTER (OUTPATIENT)
Dept: RADIOLOGY | Facility: HOSPITAL | Age: 65
Discharge: HOME | End: 2020-08-31
Attending: NURSE PRACTITIONER
Payer: MEDICARE

## 2020-08-31 VITALS
DIASTOLIC BLOOD PRESSURE: 76 MMHG | BODY MASS INDEX: 38.13 KG/M2 | OXYGEN SATURATION: 98 % | RESPIRATION RATE: 18 BRPM | WEIGHT: 242.94 LBS | HEIGHT: 67 IN | SYSTOLIC BLOOD PRESSURE: 132 MMHG | HEART RATE: 90 BPM

## 2020-08-31 DIAGNOSIS — D86.9 SARCOIDOSIS: Primary | ICD-10-CM

## 2020-08-31 DIAGNOSIS — Z72.821 INADEQUATE SLEEP HYGIENE: ICD-10-CM

## 2020-08-31 DIAGNOSIS — D86.9 SARCOIDOSIS: ICD-10-CM

## 2020-08-31 DIAGNOSIS — D86.0 PULMONARY SARCOIDOSIS: ICD-10-CM

## 2020-08-31 DIAGNOSIS — G47.33 OSA (OBSTRUCTIVE SLEEP APNEA): ICD-10-CM

## 2020-08-31 DIAGNOSIS — E66.9 OBESITY, CLASS II, BMI 35-39.9: ICD-10-CM

## 2020-08-31 LAB
BRPFT: ABNORMAL
DLCO ADJ PRE: 15.83 ML/(MIN*MMHG) (ref 18.1–29.56)
DLCO SINGLE BREATH LLN: 18.1
DLCO SINGLE BREATH PRE REF: 66.4 %
DLCO SINGLE BREATH REF: 23.83
DLCOC SBVA LLN: 3.06
DLCOC SBVA PRE REF: 99.2 %
DLCOC SBVA REF: 4.39
DLCOC SINGLE BREATH LLN: 18.1
DLCOC SINGLE BREATH PRE REF: 66.4 %
DLCOC SINGLE BREATH REF: 23.83
DLCOVA LLN: 3.06
DLCOVA PRE REF: 99.2 %
DLCOVA PRE: 4.35 ML/(MIN*MMHG*L) (ref 3.06–5.71)
DLCOVA REF: 4.39
DLVAADJ PRE: 4.35 ML/(MIN*MMHG*L) (ref 3.06–5.71)
ERV LLN: -16449.24
ERV PRE REF: 50.3 %
ERV REF: 0.76
FEF 25 75 LLN: 0.77
FEF 25 75 PRE REF: 72.6 %
FEF 25 75 REF: 1.92
FEV1 FVC LLN: 66
FEV1 FVC PRE REF: 97.2 %
FEV1 FVC REF: 79
FEV1 LLN: 1.58
FEV1 PRE REF: 75.1 %
FEV1 REF: 2.22
FRCPLETH LLN: 2.05
FRCPLETH PREREF: 90.3 %
FRCPLETH REF: 2.87
FVC LLN: 2.05
FVC PRE REF: 76.7 %
FVC REF: 2.83
IVC PRE: 2.16 L (ref 2.05–3.62)
IVC SINGLE BREATH LLN: 2.05
IVC SINGLE BREATH PRE REF: 76.4 %
IVC SINGLE BREATH REF: 2.83
MVV LLN: 85
MVV PRE REF: 40.2 %
MVV REF: 100
PEF LLN: 3.54
PEF PRE REF: 84 %
PEF REF: 5.74
PRE DLCO: 15.83 ML/(MIN*MMHG) (ref 18.1–29.56)
PRE ERV: 0.38 L (ref -16449.24–16450.76)
PRE FEF 25 75: 1.39 L/S (ref 0.77–3.06)
PRE FET 100: 6.68 SEC
PRE FEV1 FVC: 76.55 % (ref 66.45–91.01)
PRE FEV1: 1.67 L (ref 1.58–2.85)
PRE FRC PL: 2.59 L
PRE FVC: 2.17 L (ref 2.05–3.62)
PRE MVV: 40 L/MIN (ref 84.66–114.54)
PRE PEF: 4.82 L/S (ref 3.54–7.93)
PRE RV: 2.2 L (ref 1.54–2.69)
PRE TLC: 4.48 L (ref 4.44–6.42)
RAW LLN: 3.06
RAW PRE REF: 131.8 %
RAW PRE: 4.03 CMH2O*S/L (ref 3.06–3.06)
RAW REF: 3.06
RV LLN: 1.54
RV PRE REF: 104.1 %
RV REF: 2.12
RVTLC LLN: 31
RVTLC PRE REF: 119.7 %
RVTLC PRE: 49.14 % (ref 31.47–50.65)
RVTLC REF: 41
TLC LLN: 4.44
TLC PRE REF: 82.6 %
TLC REF: 5.43
VA PRE: 3.63 L (ref 5.28–5.28)
VA SINGLE BREATH LLN: 5.28
VA SINGLE BREATH PRE REF: 68.8 %
VA SINGLE BREATH REF: 5.28
VC LLN: 2.05
VC PRE REF: 80.4 %
VC PRE: 2.28 L (ref 2.05–3.62)
VC REF: 2.83
VTGRAWPRE: 2.82 L

## 2020-08-31 PROCEDURE — 94729 DIFFUSING CAPACITY: CPT | Mod: PBBFAC

## 2020-08-31 PROCEDURE — 99214 OFFICE O/P EST MOD 30 MIN: CPT | Mod: 25,S$PBB,, | Performed by: NURSE PRACTITIONER

## 2020-08-31 PROCEDURE — 94726 PLETHYSMOGRAPHY LUNG VOLUMES: CPT | Mod: 26,S$PBB,, | Performed by: INTERNAL MEDICINE

## 2020-08-31 PROCEDURE — 94726 PLETHYSMOGRAPHY LUNG VOLUMES: CPT | Mod: PBBFAC

## 2020-08-31 PROCEDURE — 71046 X-RAY EXAM CHEST 2 VIEWS: CPT | Mod: TC

## 2020-08-31 PROCEDURE — 99214 OFFICE O/P EST MOD 30 MIN: CPT | Mod: PBBFAC,25 | Performed by: NURSE PRACTITIONER

## 2020-08-31 PROCEDURE — 99214 PR OFFICE/OUTPT VISIT, EST, LEVL IV, 30-39 MIN: ICD-10-PCS | Mod: 25,S$PBB,, | Performed by: NURSE PRACTITIONER

## 2020-08-31 PROCEDURE — 94729 DIFFUSING CAPACITY: CPT | Mod: 26,S$PBB,, | Performed by: INTERNAL MEDICINE

## 2020-08-31 PROCEDURE — 94010 BREATHING CAPACITY TEST: CPT | Mod: 26,S$PBB,, | Performed by: INTERNAL MEDICINE

## 2020-08-31 PROCEDURE — 94010 BREATHING CAPACITY TEST: CPT | Mod: PBBFAC

## 2020-08-31 PROCEDURE — 99999 PR PBB SHADOW E&M-EST. PATIENT-LVL IV: ICD-10-PCS | Mod: PBBFAC,,, | Performed by: NURSE PRACTITIONER

## 2020-08-31 PROCEDURE — 94729 PR C02/MEMBANE DIFFUSE CAPACITY: ICD-10-PCS | Mod: 26,S$PBB,, | Performed by: INTERNAL MEDICINE

## 2020-08-31 PROCEDURE — 94010 BREATHING CAPACITY TEST: ICD-10-PCS | Mod: 26,S$PBB,, | Performed by: INTERNAL MEDICINE

## 2020-08-31 PROCEDURE — 99999 PR PBB SHADOW E&M-EST. PATIENT-LVL IV: CPT | Mod: PBBFAC,,, | Performed by: NURSE PRACTITIONER

## 2020-08-31 PROCEDURE — 71046 XR CHEST PA AND LATERAL: ICD-10-PCS | Mod: 26,,, | Performed by: RADIOLOGY

## 2020-08-31 PROCEDURE — 94726 PULM FUNCT TST PLETHYSMOGRAP: ICD-10-PCS | Mod: 26,S$PBB,, | Performed by: INTERNAL MEDICINE

## 2020-08-31 PROCEDURE — 71046 X-RAY EXAM CHEST 2 VIEWS: CPT | Mod: 26,,, | Performed by: RADIOLOGY

## 2020-08-31 RX ORDER — PIOGLITAZONEHYDROCHLORIDE 15 MG/1
TABLET ORAL
COMMUNITY
Start: 2020-07-06

## 2020-08-31 RX ORDER — ATORVASTATIN CALCIUM 20 MG/1
TABLET, FILM COATED ORAL
COMMUNITY
Start: 2020-07-06

## 2020-08-31 NOTE — PROGRESS NOTES
"Subjective:      Patient ID: Rose Leal is a 65 y.o. female.    Chief Complaint: Sleep Apnea and Sarcoidosis (rev pft cxr)    HPI  Patient with sleep apnea and sarcoidosis. No fever, chills, or hemoptysis. No pleuritic type chest pain. Breathing is stable as compared to 6 months ago.  Sarcoid diagnosed by bronchoscopy 1992 by [facility]. She misplaced her CPAP when staying at a family members house. She will locate and try to start wearing. She has a hard time tolerating the mask.     Patient Active Problem List   Diagnosis    Multiple joint pain    LYLE (obstructive sleep apnea)    CRP elevated    Parotid gland enlargement    Sicca syndrome    Pulmonary sarcoidosis    Vitamin D deficiency    Fibromyalgia    Inadequate sleep hygiene    Anemia, chronic disease    Other vitamin B12 deficiency anemias    Forgetfulness    At high risk for constipation    Iron deficiency anemia    Obesity, Class II, BMI 35-39.9        /76   Pulse 90   Resp 18   Ht 5' 7" (1.702 m)   Wt 110.2 kg (242 lb 15.2 oz)   SpO2 98%   BMI 38.05 kg/m²   Body mass index is 38.05 kg/m².    Review of Systems   Constitutional: Negative.    HENT: Negative.    Respiratory: Positive for snoring.    Cardiovascular: Negative.    Musculoskeletal: Negative.    Gastrointestinal: Negative.    Neurological: Negative.    Psychiatric/Behavioral: Positive for sleep disturbance.     Objective:      Physical Exam  Constitutional:       Appearance: She is well-developed. She is obese.   HENT:      Head: Normocephalic and atraumatic.      Mouth/Throat:      Comments: Wearing mask due to covid concerns  Neck:      Musculoskeletal: Normal range of motion and neck supple.   Cardiovascular:      Rate and Rhythm: Normal rate and regular rhythm.      Heart sounds: No murmur. No gallop.    Pulmonary:      Effort: Pulmonary effort is normal.      Breath sounds: Normal breath sounds.   Abdominal:      Palpations: Abdomen is soft. " There is no mass.      Tenderness: There is no abdominal tenderness.   Musculoskeletal: Normal range of motion.   Skin:     General: Skin is warm and dry.   Neurological:      Mental Status: She is alert and oriented to person, place, and time.   Psychiatric:         Mood and Affect: Mood normal.         Behavior: Behavior normal.       Personal Diagnostic Review    Results for orders placed during the hospital encounter of 08/31/20   X-Ray Chest PA And Lateral    Narrative EXAMINATION:  XR CHEST PA AND LATERAL    CLINICAL HISTORY:  Sarcoidosis, unspecified    TECHNIQUE:  PA and lateral views of the chest were performed.    COMPARISON:  09/23/2019    FINDINGS:  No focal consolidation or effusion.  Cardiomediastinal silhouette is stable in size and configuration.  Postsurgical changes of the lower cervical spine.      Impression Stable chest.  No active disease      Electronically signed by: Renato Barclay MD  Date:    08/31/2020  Time:    12:24     PFT stable    Assessment:       1. Sarcoidosis    2. LYLE (obstructive sleep apnea)    3. Pulmonary sarcoidosis    4. Inadequate sleep hygiene    5. Obesity, Class II, BMI 35-39.9        Outpatient Encounter Medications as of 8/31/2020   Medication Sig Dispense Refill    amitriptyline (ELAVIL) 25 MG tablet Take 1 tablet (25 mg total) by mouth every evening. 90 tablet 3    amlodipine-benazepril (LOTREL) 10-40 mg per capsule Take 1 capsule by mouth once daily.  0    atorvastatin (LIPITOR) 20 MG tablet       carvedilol (COREG) 6.25 MG tablet Take 6.25 mg by mouth 2 (two) times daily with meals.  0    cyclobenzaprine (FLEXERIL) 10 MG tablet 10 mg 3 (three) times daily as needed.   0     mg capsule TAKE ONE CAPSULE BY MOUTH DAILY AS NEEDED FOR CONSTIPATION. 30 capsule 0    gabapentin (NEURONTIN) 300 MG capsule Take 1 capsule (300 mg total) by mouth 3 (three) times daily. 90 capsule 3    glimepiride (AMARYL) 1 MG tablet 2 mg once daily.  0     HYDROcodone-acetaminophen (NORCO)  mg per tablet Take 1 tablet by mouth every 12 (twelve) hours as needed for Pain.      ibuprofen (ADVIL,MOTRIN) 800 MG tablet Take 800 mg by mouth 3 (three) times daily.      iron-vitamin C 100-250 mg, ICAR-C, 100-250 mg Tab TAKE 1 TABLET BY MOUTH EVERY DAY 30 tablet 0    NEXIUM 40 mg capsule 40 mg once daily.  0    pioglitazone (ACTOS) 15 MG tablet       pravastatin (PRAVACHOL) 40 MG tablet Take 40 mg by mouth once daily.  0    albuterol 90 mcg/actuation inhaler Inhale 2 puffs into the lungs every 4 (four) hours as needed for Wheezing. (Patient not taking: Reported on 8/31/2020) 1 Inhaler 2     No facility-administered encounter medications on file as of 8/31/2020.      Orders Placed This Encounter   Procedures    X-Ray Chest PA And Lateral     Standing Status:   Future     Standing Expiration Date:   8/31/2021     Order Specific Question:   May the Radiologist modify the order per protocol to meet the clinical needs of the patient?     Answer:   Yes    Complete PFT without bronchodilator - Clinic     Standing Status:   Future     Standing Expiration Date:   8/31/2021     Plan:        Problem List Items Addressed This Visit        Immunology/Multi System    Pulmonary sarcoidosis - Primary    Overview     Diagnosed by Dr. Villegas 1992 with bronchoscopy. Stable            Endocrine    Obesity, Class II, BMI 35-39.9       Other    LYLE (obstructive sleep apnea)    Overview     AHI 71/hr. CPAP 14cm H20, [facility]         Current Assessment & Plan     Locate CPAP and start wearing nightly         Inadequate sleep hygiene      Weight loss and exercise to improve overall health.

## 2021-07-12 ENCOUNTER — DOCUMENTATION ONLY (OUTPATIENT)
Dept: PULMONOLOGY | Facility: CLINIC | Age: 66
End: 2021-07-12

## 2021-07-13 ENCOUNTER — OFFICE VISIT (OUTPATIENT)
Dept: SLEEP MEDICINE | Facility: CLINIC | Age: 66
End: 2021-07-13
Payer: MEDICARE

## 2021-07-13 ENCOUNTER — CLINICAL SUPPORT (OUTPATIENT)
Dept: PULMONOLOGY | Facility: CLINIC | Age: 66
End: 2021-07-13
Payer: MEDICARE

## 2021-07-13 ENCOUNTER — HOSPITAL ENCOUNTER (OUTPATIENT)
Dept: RADIOLOGY | Facility: HOSPITAL | Age: 66
Discharge: HOME | End: 2021-07-13
Attending: NURSE PRACTITIONER
Payer: MEDICARE

## 2021-07-13 VITALS
OXYGEN SATURATION: 97 % | HEIGHT: 67 IN | BODY MASS INDEX: 39.44 KG/M2 | WEIGHT: 251.31 LBS | DIASTOLIC BLOOD PRESSURE: 80 MMHG | HEART RATE: 78 BPM | SYSTOLIC BLOOD PRESSURE: 130 MMHG | RESPIRATION RATE: 20 BRPM

## 2021-07-13 DIAGNOSIS — D86.9 SARCOIDOSIS: ICD-10-CM

## 2021-07-13 DIAGNOSIS — G47.33 OSA (OBSTRUCTIVE SLEEP APNEA): ICD-10-CM

## 2021-07-13 DIAGNOSIS — D86.0 PULMONARY SARCOIDOSIS: Primary | ICD-10-CM

## 2021-07-13 DIAGNOSIS — E66.9 OBESITY, CLASS II, BMI 35-39.9: ICD-10-CM

## 2021-07-13 LAB
BRPFT: ABNORMAL
DLCO ADJ PRE: 17.36 ML/(MIN*MMHG) (ref 17.71–29.17)
DLCO SINGLE BREATH LLN: 17.71
DLCO SINGLE BREATH PRE REF: 74.1 %
DLCO SINGLE BREATH REF: 23.44
DLCOC SBVA LLN: 3.03
DLCOC SBVA PRE REF: 109.5 %
DLCOC SBVA REF: 4.37
DLCOC SINGLE BREATH LLN: 17.71
DLCOC SINGLE BREATH PRE REF: 74.1 %
DLCOC SINGLE BREATH REF: 23.44
DLCOVA LLN: 3.03
DLCOVA PRE REF: 109.5 %
DLCOVA PRE: 4.78 ML/(MIN*MMHG*L) (ref 3.03–5.71)
DLCOVA REF: 4.37
DLVAADJ PRE: 4.78 ML/(MIN*MMHG*L) (ref 3.03–5.71)
ERV LLN: -16449.26
ERV PRE REF: 69.2 %
ERV REF: 0.74
FEF 25 75 LLN: 0.74
FEF 25 75 PRE REF: 72.9 %
FEF 25 75 REF: 1.87
FEV1 FVC LLN: 66
FEV1 FVC PRE REF: 98.8 %
FEV1 FVC REF: 79
FEV1 LLN: 1.54
FEV1 PRE REF: 67.3 %
FEV1 REF: 2.17
FRCPLETH LLN: 2.03
FRCPLETH PREREF: 82.5 %
FRCPLETH REF: 2.85
FVC LLN: 1.99
FVC PRE REF: 67.7 %
FVC REF: 2.77
IVC PRE: 1.78 L (ref 1.99–3.55)
IVC SINGLE BREATH LLN: 1.99
IVC SINGLE BREATH PRE REF: 64.4 %
IVC SINGLE BREATH REF: 2.77
MVV LLN: 82
MVV PRE REF: 70.1 %
MVV REF: 97
PEF LLN: 3.43
PEF PRE REF: 92 %
PEF REF: 5.59
PRE DLCO: 17.36 ML/(MIN*MMHG) (ref 17.71–29.17)
PRE ERV: 0.51 L (ref -16449.26–16450.74)
PRE FEF 25 75: 1.36 L/S (ref 0.74–3)
PRE FET 100: 4.78 SEC
PRE FEV1 FVC: 77.71 % (ref 66.28–91.06)
PRE FEV1: 1.46 L (ref 1.54–2.79)
PRE FRC PL: 2.35 L
PRE FVC: 1.88 L (ref 1.99–3.55)
PRE MVV: 68 L/MIN (ref 82.47–111.57)
PRE PEF: 5.14 L/S (ref 3.43–7.76)
PRE RV: 1.85 L (ref 1.54–2.69)
PRE TLC: 4.03 L (ref 4.38–6.35)
RAW LLN: 3.06
RAW PRE REF: 88.3 %
RAW PRE: 2.7 CMH2O*S/L (ref 3.06–3.06)
RAW REF: 3.06
RV LLN: 1.54
RV PRE REF: 87.6 %
RV REF: 2.11
RVTLC LLN: 32
RVTLC PRE REF: 111 %
RVTLC PRE: 45.95 % (ref 31.81–50.99)
RVTLC REF: 41
TLC LLN: 4.38
TLC PRE REF: 75.2 %
TLC REF: 5.36
VA PRE: 3.63 L (ref 5.21–5.21)
VA SINGLE BREATH LLN: 5.21
VA SINGLE BREATH PRE REF: 69.6 %
VA SINGLE BREATH REF: 5.21
VC LLN: 1.99
VC PRE REF: 78.7 %
VC PRE: 2.18 L (ref 1.99–3.55)
VC REF: 2.77
VTGRAWPRE: 2.83 L

## 2021-07-13 PROCEDURE — 99214 OFFICE O/P EST MOD 30 MIN: CPT | Mod: 25,S$GLB,, | Performed by: NURSE PRACTITIONER

## 2021-07-13 PROCEDURE — 94726 PLETHYSMOGRAPHY LUNG VOLUMES: CPT | Mod: S$GLB,,, | Performed by: INTERNAL MEDICINE

## 2021-07-13 PROCEDURE — 94726 PLETHYSMOGRAPHY LUNG VOLUMES: CPT | Mod: PBBFAC

## 2021-07-13 PROCEDURE — 99999 PR PBB SHADOW E&M-EST. PATIENT-LVL IV: CPT | Mod: PBBFAC,,, | Performed by: NURSE PRACTITIONER

## 2021-07-13 PROCEDURE — 71046 X-RAY EXAM CHEST 2 VIEWS: CPT | Mod: TC

## 2021-07-13 PROCEDURE — 94010 BREATHING CAPACITY TEST: ICD-10-PCS | Mod: S$GLB,,, | Performed by: INTERNAL MEDICINE

## 2021-07-13 PROCEDURE — 94729 DIFFUSING CAPACITY: CPT | Mod: S$GLB,,, | Performed by: INTERNAL MEDICINE

## 2021-07-13 PROCEDURE — 94729 DIFFUSING CAPACITY: CPT | Mod: PBBFAC

## 2021-07-13 PROCEDURE — 99214 PR OFFICE/OUTPT VISIT, EST, LEVL IV, 30-39 MIN: ICD-10-PCS | Mod: 25,S$GLB,, | Performed by: NURSE PRACTITIONER

## 2021-07-13 PROCEDURE — 94010 BREATHING CAPACITY TEST: CPT | Mod: S$GLB,,, | Performed by: INTERNAL MEDICINE

## 2021-07-13 PROCEDURE — 99214 OFFICE O/P EST MOD 30 MIN: CPT | Mod: PBBFAC | Performed by: NURSE PRACTITIONER

## 2021-07-13 PROCEDURE — 94726 PULM FUNCT TST PLETHYSMOGRAP: ICD-10-PCS | Mod: S$GLB,,, | Performed by: INTERNAL MEDICINE

## 2021-07-13 PROCEDURE — 99999 PR PBB SHADOW E&M-EST. PATIENT-LVL IV: ICD-10-PCS | Mod: PBBFAC,,, | Performed by: NURSE PRACTITIONER

## 2021-07-13 PROCEDURE — 71046 XR CHEST PA AND LATERAL: ICD-10-PCS | Mod: 26,,, | Performed by: RADIOLOGY

## 2021-07-13 PROCEDURE — 71046 X-RAY EXAM CHEST 2 VIEWS: CPT | Mod: 26,,, | Performed by: RADIOLOGY

## 2021-07-13 PROCEDURE — 94010 BREATHING CAPACITY TEST: CPT | Mod: PBBFAC

## 2021-07-13 PROCEDURE — 94729 PR C02/MEMBANE DIFFUSE CAPACITY: ICD-10-PCS | Mod: S$GLB,,, | Performed by: INTERNAL MEDICINE

## 2023-03-23 NOTE — TELEPHONE ENCOUNTER
Tried to contact patient for an anemia consult per Dr ESHA Escalante.   [Negative] : Heme/Lymph [Fever] : no fever [Chills] : no chills [Hot Flashes] : no hot flashes [Night Sweats] : no night sweats [Discharge] : no discharge [Redness] : no redness [Vision Problems] : no vision problems [Earache] : no earache [Hearing Loss] : no hearing loss [Nosebleed] : no nosebleeds [Hoarseness] : no hoarseness [Nasal Discharge] : no nasal discharge [Sore Throat] : no sore throat [Chest Pain] : no chest pain [Palpitations] : no palpitations [Leg Claudication] : no leg claudication [Lower Ext Edema] : no lower extremity edema [Orthopnea] : no orthopnea [Nausea] : no nausea [Abdominal Pain] : no abdominal pain [Constipation] : no constipation [Diarrhea] : diarrhea [Vomiting] : no vomiting [Heartburn] : no heartburn [Melena] : no melena [Incontinence] : no incontinence [Dysuria] : no dysuria [Nocturia] : no nocturia [Poor Libido] : libido not poor [Hematuria] : no hematuria [Frequency] : no frequency [Vaginal Discharge] : no vaginal discharge [Dysmenorrhea] : no dysmenorrhea [Joint Pain] : no joint pain [Joint Swelling] : no joint swelling [Muscle Weakness] : no muscle weakness [Back Pain] : no back pain [Muscle Pain] : no muscle pain [Itching] : no itching [Mole Changes] : no mole changes [Nail Changes] : no nail changes [Hair Changes] : no hair changes [Skin Rash] : no skin rash [Headache] : no headache [Dizziness] : no dizziness [Fainting] : no fainting [Confusion] : no confusion [Memory Loss] : no memory loss [Anxiety] : no anxiety [Depression] : no depression [Easy Bruising] : no easy bruising [Swollen Glands] : no swollen glands

## 2025-06-03 ENCOUNTER — TELEPHONE (OUTPATIENT)
Dept: PODIATRY | Facility: CLINIC | Age: 70
End: 2025-06-03
Payer: MEDICARE

## 2025-06-10 ENCOUNTER — HOSPITAL ENCOUNTER (OUTPATIENT)
Dept: RADIOLOGY | Facility: HOSPITAL | Age: 70
Discharge: HOME | End: 2025-06-10
Attending: PODIATRIST
Payer: MEDICARE

## 2025-06-10 ENCOUNTER — OFFICE VISIT (OUTPATIENT)
Dept: PODIATRY | Facility: CLINIC | Age: 70
End: 2025-06-10
Payer: MEDICARE

## 2025-06-10 DIAGNOSIS — M51.362 DEGENERATION OF INTERVERTEBRAL DISC OF LUMBAR REGION WITH DISCOGENIC BACK PAIN AND LOWER EXTREMITY PAIN: ICD-10-CM

## 2025-06-10 DIAGNOSIS — M72.2 PLANTAR FASCIITIS: ICD-10-CM

## 2025-06-10 DIAGNOSIS — M76.62 TENDONITIS, ACHILLES, LEFT: ICD-10-CM

## 2025-06-10 DIAGNOSIS — M72.2 PLANTAR FASCIITIS: Primary | ICD-10-CM

## 2025-06-10 DIAGNOSIS — M79.672 PAIN IN LEFT FOOT: ICD-10-CM

## 2025-06-10 DIAGNOSIS — M77.32 HEEL SPUR, LEFT: ICD-10-CM

## 2025-06-10 DIAGNOSIS — G57.52 TARSAL TUNNEL SYNDROME, LEFT: ICD-10-CM

## 2025-06-10 DIAGNOSIS — M77.52 BURSITIS OF POSTERIOR HEEL, LEFT: ICD-10-CM

## 2025-06-10 DIAGNOSIS — M24.572 CONTRACTURE, LEFT ANKLE: ICD-10-CM

## 2025-06-10 DIAGNOSIS — M48.062 LUMBAR STENOSIS WITH NEUROGENIC CLAUDICATION: ICD-10-CM

## 2025-06-10 PROCEDURE — 3044F HG A1C LEVEL LT 7.0%: CPT | Mod: CPTII,S$GLB,, | Performed by: PODIATRIST

## 2025-06-10 PROCEDURE — 3288F FALL RISK ASSESSMENT DOCD: CPT | Mod: CPTII,S$GLB,, | Performed by: PODIATRIST

## 2025-06-10 PROCEDURE — 73650 X-RAY EXAM OF HEEL: CPT | Mod: TC,LT

## 2025-06-10 PROCEDURE — 20550 NJX 1 TENDON SHEATH/LIGAMENT: CPT | Mod: LT,S$GLB,, | Performed by: PODIATRIST

## 2025-06-10 PROCEDURE — 1101F PT FALLS ASSESS-DOCD LE1/YR: CPT | Mod: CPTII,S$GLB,, | Performed by: PODIATRIST

## 2025-06-10 PROCEDURE — 99999 PR PBB SHADOW E&M-EST. PATIENT-LVL III: CPT | Mod: PBBFAC,,, | Performed by: PODIATRIST

## 2025-06-10 PROCEDURE — 99204 OFFICE O/P NEW MOD 45 MIN: CPT | Mod: 25,S$GLB,, | Performed by: PODIATRIST

## 2025-06-10 PROCEDURE — 1160F RVW MEDS BY RX/DR IN RCRD: CPT | Mod: CPTII,S$GLB,, | Performed by: PODIATRIST

## 2025-06-10 PROCEDURE — 1125F AMNT PAIN NOTED PAIN PRSNT: CPT | Mod: CPTII,S$GLB,, | Performed by: PODIATRIST

## 2025-06-10 PROCEDURE — 4010F ACE/ARB THERAPY RXD/TAKEN: CPT | Mod: CPTII,S$GLB,, | Performed by: PODIATRIST

## 2025-06-10 PROCEDURE — 1159F MED LIST DOCD IN RCRD: CPT | Mod: CPTII,S$GLB,, | Performed by: PODIATRIST

## 2025-06-10 RX ORDER — TRIAMCINOLONE ACETONIDE 40 MG/ML
40 INJECTION, SUSPENSION INTRA-ARTICULAR; INTRAMUSCULAR ONCE
Status: COMPLETED | OUTPATIENT
Start: 2025-06-10 | End: 2025-06-10

## 2025-06-10 RX ORDER — DEXAMETHASONE SODIUM PHOSPHATE 4 MG/ML
4 INJECTION, SOLUTION INTRA-ARTICULAR; INTRALESIONAL; INTRAMUSCULAR; INTRAVENOUS; SOFT TISSUE ONCE
Status: COMPLETED | OUTPATIENT
Start: 2025-06-10 | End: 2025-06-10

## 2025-06-10 RX ORDER — TIRZEPATIDE 15 MG/.5ML
INJECTION, SOLUTION SUBCUTANEOUS
COMMUNITY
Start: 2025-05-27

## 2025-06-10 RX ADMIN — TRIAMCINOLONE ACETONIDE 40 MG: 40 INJECTION, SUSPENSION INTRA-ARTICULAR; INTRAMUSCULAR at 10:06

## 2025-06-10 RX ADMIN — DEXAMETHASONE SODIUM PHOSPHATE 4 MG: 4 INJECTION, SOLUTION INTRA-ARTICULAR; INTRALESIONAL; INTRAMUSCULAR; INTRAVENOUS; SOFT TISSUE at 10:06

## 2025-06-10 NOTE — PROGRESS NOTES
Subjective:       Patient ID: Rose Leal is a 70 y.o. female.    Chief Complaint: bone spur (C/o a bone spur on the left foot, pt states for over a week now, pt states all of sudden it started hurting, pt rates pain 10/10, pt is diabetic,)      HPI: Rose Leal complains of moderate to severe pains to the left foot. States pains are sharp and stabbing-like in nature. Pains are to the plantar foot, mostly with walking and standing. Rates the pains at approx. 10/10. States post-static dyskinesia. Denies any recent identifiable trauma. Does limp with gait. States NSAID medications thus far. Pains have been present for the past several weeks to months and the pains have worsened over the past couple of weeks. She does have a history of plantar fasciitis. States walking and standing causes and/or exacerbates the symptoms. Patient has had no corticosteroid injection(s) to the left foot, prior. Patient's Primary Care Provider is Manny Escalante MD. Does follow with Pain Management for cervical and lumbar spine pathology. Has had SHAYLEE to the lumbar spine.    Review of patient's allergies indicates:   Allergen Reactions    Oxycodone      Other reaction(s): Nausea/Vomiting    Percocet [oxycodone-acetaminophen] Nausea And Vomiting    Prednisone Edema       Past Medical History:   Diagnosis Date    Arthritis     Blunt trauma, left eye     Carpal tunnel syndrome     Diabetes mellitus     Hypertension     Neuromuscular disorder     LYLE (obstructive sleep apnea) Severe    Sarcoidosis of lung        Family History   Problem Relation Name Age of Onset    Cataracts Mother      Cataracts Maternal Grandmother      Glaucoma Maternal Grandmother         Social History[1]    Past Surgical History:   Procedure Laterality Date    BREAST SURGERY      HYSTERECTOMY      NECK SURGERY         Review of Systems   Constitutional:  Negative for chills, fatigue and fever.   HENT:  Negative for hearing loss.    Eyes:  Negative for  photophobia and visual disturbance.   Respiratory:  Negative for cough, chest tightness, shortness of breath and wheezing.    Cardiovascular:  Negative for chest pain and palpitations.   Gastrointestinal:  Negative for constipation, diarrhea, nausea and vomiting.   Endocrine: Negative for cold intolerance and heat intolerance.   Genitourinary:  Negative for flank pain.   Musculoskeletal:  Positive for back pain and gait problem. Negative for neck pain and neck stiffness.   Neurological:  Negative for light-headedness and headaches.   Psychiatric/Behavioral:  Negative for sleep disturbance.          Objective:   There were no vitals taken for this visit.    Physical Exam   LOWER EXTREMITY PHYSICAL EXAMINATION  NEUROLOGY: Proprioception is intact. Sensation to light touch is intact. Positive Tinel's Sign and negative Valleix Sign. No neurological sensations with compression of the area of Haq's Nerve in the area of the Abductor Hallucis muscle belly.    ORTHOPEDIC: There is severe tenderness to palpation of the area around the plantar medial calcaneal tubercle on the left foot. Pains are characterized as sharp and stabbing-like with direct palpation of the area. There is also moderate pain to palpation of the immediate plantar aspect of the heel, and no pains to the lateral band of the fascia. No edema is noted. No fullness is noted. There are moderate pains to palpation within the plantar fascia at the arch. No defects are noted within the plantar fascia at the arch. No plantar fibromas are noted. No defects are noted within the ligament. Dorsiflexion of the MTPJs with simultaneous palpation of the fascia at the arch, does not worsen and exacerbate the pains. Plantarflexion of the MTPJs with simultaneous palpation of the fascia at the arch, does worsen and exacerbate the pains.  No pains with medial to lateral compression of the heel. Equinus contracture is noted. Posterior heel/insertional Achilles tendon pains  are noted with bursitis. Antalgic gait pattern is noted.     DERMATOLOGY: Skin is supple, dry and intact.    Assessment:     1. Plantar fasciitis    2. Heel spur, left    3. Contracture, left ankle    4. Tendonitis, Achilles, left    5. Bursitis of posterior heel, left    6. Tarsal tunnel syndrome, left    7. Pain in left foot    8. Lumbar stenosis with neurogenic claudication    9. Degeneration of intervertebral disc of lumbar region with discogenic back pain and lower extremity pain        Plan:     Plantar fasciitis  -     dexAMETHasone injection 4 mg  -     triamcinolone acetonide injection 40 mg  -     X-Ray Calcaneus 2 View Left; Future; Expected date: 06/10/2025    Heel spur, left    Contracture, left ankle    Tendonitis, Achilles, left    Bursitis of posterior heel, left    Tarsal tunnel syndrome, left    Pain in left foot  -     dexAMETHasone injection 4 mg  -     triamcinolone acetonide injection 40 mg  -     X-Ray Calcaneus 2 View Left; Future; Expected date: 06/10/2025    Lumbar stenosis with neurogenic claudication    Degeneration of intervertebral disc of lumbar region with discogenic back pain and lower extremity pain      Thorough discussion is had with the patient today, concerning the diagnosis, its etiology, and the treatment algorithm at present.     Patient's past medical history is thoroughly reviewed today with the patient and/or family members. Complete chart review is performed at this time.     Following sterile preparation with alcohol swab and/or betadine paint, injection was given into and around the area of the left plantar medial calcaneal tubercle and above noted scar tissues, using a 25-gauge needle. Injection consisted of a mixture of Lidocaine w/ Epinephrine, Marcaine w/o Epinephrine, Kenalog-40, and Dexamethasone.    Continue PO NSAIDs.    Start CAM Walker.    CAM Walker (Walking Boot), short/tall is dispensed to the patient. The CAM Walker is appropriately fitted and customized  to the patient's lower extremity physique by the LPN/MA/Ortho Tech. Patient to ambulate with the CAM Walker at all times. The patient should not sleep with the device or shower with the device, or drive with the device (if dispensed for right ankle/foot pathology). ,    Patient is advised, and/or is referred to Pain Management for further evaluation, diagnostics and/or treatment of the aforementioned pathologies.           Future Appointments   Date Time Provider Department Center   6/10/2025 11:45 AM LARISSA CAMACHO-DR [facility]            [1]   Social History  Socioeconomic History    Marital status:    Tobacco Use    Smoking status: Never    Smokeless tobacco: Never   Substance and Sexual Activity    Alcohol use: Yes     Comment: occasional    Drug use: No

## 2025-07-17 ENCOUNTER — OFFICE VISIT (OUTPATIENT)
Dept: PODIATRY | Facility: CLINIC | Age: 70
End: 2025-07-17
Payer: MEDICARE

## 2025-07-17 VITALS — HEIGHT: 67 IN | BODY MASS INDEX: 39.44 KG/M2 | WEIGHT: 251.31 LBS

## 2025-07-17 DIAGNOSIS — M24.572 CONTRACTURE, LEFT ANKLE: ICD-10-CM

## 2025-07-17 DIAGNOSIS — M54.17 LUMBOSACRAL RADICULOPATHY: ICD-10-CM

## 2025-07-17 DIAGNOSIS — M77.52 BURSITIS OF POSTERIOR HEEL, LEFT: ICD-10-CM

## 2025-07-17 DIAGNOSIS — M72.2 PLANTAR FASCIITIS: Primary | ICD-10-CM

## 2025-07-17 DIAGNOSIS — M77.32 HEEL SPUR, LEFT: ICD-10-CM

## 2025-07-17 DIAGNOSIS — M25.572 PAIN IN LEFT ANKLE AND JOINTS OF LEFT FOOT: ICD-10-CM

## 2025-07-17 DIAGNOSIS — M76.62 TENDONITIS, ACHILLES, LEFT: ICD-10-CM

## 2025-07-17 DIAGNOSIS — M48.061 SPINAL STENOSIS OF LUMBAR REGION, UNSPECIFIED WHETHER NEUROGENIC CLAUDICATION PRESENT: ICD-10-CM

## 2025-07-17 PROCEDURE — 1125F AMNT PAIN NOTED PAIN PRSNT: CPT | Mod: CPTII,S$GLB,, | Performed by: PODIATRIST

## 2025-07-17 PROCEDURE — 99999 PR PBB SHADOW E&M-EST. PATIENT-LVL III: CPT | Mod: PBBFAC,,, | Performed by: PODIATRIST

## 2025-07-17 PROCEDURE — 4010F ACE/ARB THERAPY RXD/TAKEN: CPT | Mod: CPTII,S$GLB,, | Performed by: PODIATRIST

## 2025-07-17 PROCEDURE — 1159F MED LIST DOCD IN RCRD: CPT | Mod: CPTII,S$GLB,, | Performed by: PODIATRIST

## 2025-07-17 PROCEDURE — 3288F FALL RISK ASSESSMENT DOCD: CPT | Mod: CPTII,S$GLB,, | Performed by: PODIATRIST

## 2025-07-17 PROCEDURE — 3044F HG A1C LEVEL LT 7.0%: CPT | Mod: CPTII,S$GLB,, | Performed by: PODIATRIST

## 2025-07-17 PROCEDURE — 1101F PT FALLS ASSESS-DOCD LE1/YR: CPT | Mod: CPTII,S$GLB,, | Performed by: PODIATRIST

## 2025-07-17 PROCEDURE — 20550 NJX 1 TENDON SHEATH/LIGAMENT: CPT | Mod: LT,S$GLB,, | Performed by: PODIATRIST

## 2025-07-17 PROCEDURE — 3008F BODY MASS INDEX DOCD: CPT | Mod: CPTII,S$GLB,, | Performed by: PODIATRIST

## 2025-07-17 PROCEDURE — 99213 OFFICE O/P EST LOW 20 MIN: CPT | Mod: 25,S$GLB,, | Performed by: PODIATRIST

## 2025-07-17 RX ORDER — DEXAMETHASONE SODIUM PHOSPHATE 4 MG/ML
4 INJECTION, SOLUTION INTRA-ARTICULAR; INTRALESIONAL; INTRAMUSCULAR; INTRAVENOUS; SOFT TISSUE ONCE
Status: COMPLETED | OUTPATIENT
Start: 2025-07-17 | End: 2025-07-17

## 2025-07-17 RX ORDER — TRIAMCINOLONE ACETONIDE 40 MG/ML
40 INJECTION, SUSPENSION INTRA-ARTICULAR; INTRAMUSCULAR ONCE
Status: COMPLETED | OUTPATIENT
Start: 2025-07-17 | End: 2025-07-17

## 2025-07-17 RX ADMIN — DEXAMETHASONE SODIUM PHOSPHATE 4 MG: 4 INJECTION, SOLUTION INTRA-ARTICULAR; INTRALESIONAL; INTRAMUSCULAR; INTRAVENOUS; SOFT TISSUE at 10:07

## 2025-07-17 RX ADMIN — TRIAMCINOLONE ACETONIDE 40 MG: 40 INJECTION, SUSPENSION INTRA-ARTICULAR; INTRAMUSCULAR at 10:07

## 2025-07-17 NOTE — PROGRESS NOTES
Subjective:       Patient ID: Rose Leal is a 70 y.o. female.    Chief Complaint: Heel Spurs (Heel spur rates pain 3, nondiabetic. Possible injection )      HPI: Rose Leal presents to the office today, for follow-up concerning plantar fasciitis of the left foot. At this time, pains are 3/10 and are described as mild in nature. States less of an analgic gait pattern. States walking and standing is not as painful as compared to prior. To this juncture, patient has had 1 cortisone injections to the left foot. States NSAIDs. Patient's Primary Care Provider is Manny Escalante MD. States improved LLE Achilles Tendon pains as well. Did complete several weeks in a CAM Walker. No PT/OT to this juncture. States occasional lumbar spine pains.     Review of patient's allergies indicates:   Allergen Reactions    Oxycodone      Other reaction(s): Nausea/Vomiting    Percocet [oxycodone-acetaminophen] Nausea And Vomiting    Prednisone Edema       Past Medical History:   Diagnosis Date    Arthritis     Blunt trauma, left eye     Carpal tunnel syndrome     Diabetes mellitus     Hypertension     Neuromuscular disorder     LYLE (obstructive sleep apnea) Severe    Sarcoidosis of lung        Family History   Problem Relation Name Age of Onset    Cataracts Mother      Cataracts Maternal Grandmother      Glaucoma Maternal Grandmother         Social History[1]    Past Surgical History:   Procedure Laterality Date    BREAST SURGERY      HYSTERECTOMY      NECK SURGERY         Review of Systems   Constitutional:  Negative for chills, fatigue and fever.   HENT:  Negative for hearing loss.    Eyes:  Negative for photophobia and visual disturbance.   Respiratory:  Negative for cough, chest tightness, shortness of breath and wheezing.    Cardiovascular:  Negative for chest pain and palpitations.   Gastrointestinal:  Negative for constipation, diarrhea, nausea and vomiting.   Endocrine: Negative for cold intolerance and heat  "intolerance.   Genitourinary:  Negative for flank pain.   Musculoskeletal:  Positive for gait problem. Negative for neck pain and neck stiffness.   Neurological:  Negative for light-headedness and headaches.   Psychiatric/Behavioral:  Negative for sleep disturbance.          Objective:   Ht 5' 7" (1.702 m)   Wt 114 kg (251 lb 5.2 oz)   BMI 39.36 kg/m²     X-Ray Calcaneus 2 View Left  Narrative: EXAM:  XR CALCANEUS 2 VIEW LEFT    CLINICAL HISTORY: Heel pain    FINDINGS:  Comparisons are made to 06/02/2025. Negative for acute or healing fractures.  Stable large plantar and Achilles calcaneal spurs with focal swelling of the distal Achilles tendon appreciably.  The rest the visualized osseous structures and soft tissues are unchanged as well.  Impression: 1.  Overall, no significant interval change has occurred.    Finalized on: 6/10/2025 2:15 PM By:  Ryan Gurrola MD  [facility]# [id number]      2025-06-10 [id number]     [facility]      Physical Exam    LOWER EXTREMITY PHYSICAL EXAMINATION    NEUROLOGY: Negative Tinel's Sign and negative Valleix Sign. No neurological sensations with compression of the area of Haq's Nerve in the area of the Abductor Hallucis muscle belly.    ORTHOPEDIC: There is mild tenderness to palpation of the area around the plantar medial calcaneal tubercle on the left foot. Pains are characterized as sharp and stabbing-like with direct palpation of the area. There is also no pain to palpation of the immediate plantar aspect of the heel, and no pains to the lateral band of the fascia. No edema is noted. No fullness is noted. There are mild pains to palpation within the plantar fascia at the arch. No defects are noted within the plantar fascia at the arch. No plantar fibromas are noted. No defects are noted within the ligament. Dorsiflexion of the MTPJs with simultaneous palpation of the fascia at the arch, does not worsen and exacerbate the pains. Plantarflexion of the MTPJs with simultaneous palpation " of the fascia at the arch, does worsen and exacerbate the pains.  No pains with medial to lateral compression of the heel. Equinus contracture is noted. Mild insertional Achilles tendon pains are noted. Non-antalgic gait pattern is noted.     Assessment:     1. Plantar fasciitis    2. Heel spur, left    3. Contracture, left ankle    4. Tendonitis, Achilles, left    5. Bursitis of posterior heel, left    6. Pain in left ankle and joints of left foot    7. Lumbosacral radiculopathy    8. Spinal stenosis of lumbar region, unspecified whether neurogenic claudication present          Plan:     Plantar fasciitis  -     dexAMETHasone injection 4 mg  -     triamcinolone acetonide injection 40 mg    Heel spur, left    Contracture, left ankle    Tendonitis, Achilles, left    Bursitis of posterior heel, left    Pain in left ankle and joints of left foot    Lumbosacral radiculopathy    Spinal stenosis of lumbar region, unspecified whether neurogenic claudication present      Thorough discussion is had with the patient today, concerning the diagnosis, its etiology, and the treatment algorithm at present.     XRAYS are reviewed in detail with the patient. All questions and concerns regarding findings and its/their implications are outlined and discussed.    Following sterile preparation with alcohol swab and/or betadine paint, injection was given into and around the area of the left plantar medial calcaneal tubercle, using a 25-gauge needle. Injection consisted of a mixture of Lidocaine w/ Epinephrine, Marcaine w/o Epinephrine, Kenalog-40, and Dexamethasone.    Patient should ambulate and would feel best in shoe gear with slight heel elevation as it does unload/de-tension the Achilles tendon, thus lessening its pull at the posterior heel bone, resulting in pain relief.              No future appointments.         [1]   Social History  Socioeconomic History    Marital status:    Tobacco Use    Smoking status: Never     Smokeless tobacco: Never   Substance and Sexual Activity    Alcohol use: Yes     Comment: occasional    Drug use: No